# Patient Record
Sex: FEMALE | Race: WHITE | NOT HISPANIC OR LATINO | Employment: OTHER | URBAN - METROPOLITAN AREA
[De-identification: names, ages, dates, MRNs, and addresses within clinical notes are randomized per-mention and may not be internally consistent; named-entity substitution may affect disease eponyms.]

---

## 2024-06-16 ENCOUNTER — APPOINTMENT (OUTPATIENT)
Dept: OTHER | Facility: HOSPITAL | Age: 74
DRG: 042 | End: 2024-06-16
Payer: MEDICARE

## 2024-06-16 ENCOUNTER — HOSPITAL ENCOUNTER (INPATIENT)
Facility: HOSPITAL | Age: 74
LOS: 3 days | Discharge: NURSING, REHAB OR LONG-TERM CARE | DRG: 042 | End: 2024-06-20
Attending: INTERNAL MEDICINE | Admitting: INTERNAL MEDICINE
Payer: MEDICARE

## 2024-06-16 ENCOUNTER — APPOINTMENT (OUTPATIENT)
Dept: CT IMAGING | Facility: HOSPITAL | Age: 74
DRG: 042 | End: 2024-06-16
Payer: MEDICARE

## 2024-06-16 ENCOUNTER — APPOINTMENT (OUTPATIENT)
Dept: MRI IMAGING | Facility: HOSPITAL | Age: 74
DRG: 042 | End: 2024-06-16
Payer: MEDICARE

## 2024-06-16 DIAGNOSIS — R13.11 ORAL PHASE DYSPHAGIA: Primary | ICD-10-CM

## 2024-06-16 DIAGNOSIS — R47.01 APHASIA: ICD-10-CM

## 2024-06-16 DIAGNOSIS — I63.412 CEREBROVASCULAR ACCIDENT (CVA) DUE TO EMBOLISM OF LEFT MIDDLE CEREBRAL ARTERY: ICD-10-CM

## 2024-06-16 PROBLEM — I10 ESSENTIAL HYPERTENSION: Status: ACTIVE | Noted: 2024-06-16

## 2024-06-16 PROBLEM — I63.9 ACUTE CVA (CEREBROVASCULAR ACCIDENT): Status: ACTIVE | Noted: 2024-06-16

## 2024-06-16 PROBLEM — I72.5 BASILAR ARTERY ANEURYSM: Status: ACTIVE | Noted: 2024-06-16

## 2024-06-16 LAB — GLUCOSE BLDC GLUCOMTR-MCNC: 115 MG/DL (ref 70–130)

## 2024-06-16 PROCEDURE — 70496 CT ANGIOGRAPHY HEAD: CPT

## 2024-06-16 PROCEDURE — 70551 MRI BRAIN STEM W/O DYE: CPT

## 2024-06-16 PROCEDURE — G0378 HOSPITAL OBSERVATION PER HR: HCPCS

## 2024-06-16 PROCEDURE — 99222 1ST HOSP IP/OBS MODERATE 55: CPT | Performed by: NURSE PRACTITIONER

## 2024-06-16 PROCEDURE — 4A03X5D MEASUREMENT OF ARTERIAL FLOW, INTRACRANIAL, EXTERNAL APPROACH: ICD-10-PCS | Performed by: RADIOLOGY

## 2024-06-16 PROCEDURE — 82948 REAGENT STRIP/BLOOD GLUCOSE: CPT

## 2024-06-16 PROCEDURE — 99291 CRITICAL CARE FIRST HOUR: CPT | Performed by: INTERNAL MEDICINE

## 2024-06-16 PROCEDURE — 70450 CT HEAD/BRAIN W/O DYE: CPT

## 2024-06-16 PROCEDURE — 70544 MR ANGIOGRAPHY HEAD W/O DYE: CPT

## 2024-06-16 PROCEDURE — 25510000001 IOPAMIDOL PER 1 ML: Performed by: INTERNAL MEDICINE

## 2024-06-16 RX ORDER — SODIUM CHLORIDE 0.9 % (FLUSH) 0.9 %
10 SYRINGE (ML) INJECTION EVERY 12 HOURS SCHEDULED
Status: DISCONTINUED | OUTPATIENT
Start: 2024-06-16 | End: 2024-06-20 | Stop reason: HOSPADM

## 2024-06-16 RX ORDER — NICARDIPINE HYDROCHLORIDE 2.5 MG/ML
INJECTION INTRAVENOUS
Status: DISCONTINUED
Start: 2024-06-16 | End: 2024-06-16 | Stop reason: WASHOUT

## 2024-06-16 RX ORDER — ATORVASTATIN CALCIUM 40 MG/1
80 TABLET, FILM COATED ORAL NIGHTLY
Status: DISCONTINUED | OUTPATIENT
Start: 2024-06-16 | End: 2024-06-20 | Stop reason: HOSPADM

## 2024-06-16 RX ORDER — CLOPIDOGREL BISULFATE 75 MG/1
300 TABLET ORAL ONCE
Status: COMPLETED | OUTPATIENT
Start: 2024-06-17 | End: 2024-06-17

## 2024-06-16 RX ORDER — SODIUM CHLORIDE 9 MG/ML
40 INJECTION, SOLUTION INTRAVENOUS AS NEEDED
Status: DISCONTINUED | OUTPATIENT
Start: 2024-06-16 | End: 2024-06-20 | Stop reason: HOSPADM

## 2024-06-16 RX ORDER — SODIUM CHLORIDE 0.9 % (FLUSH) 0.9 %
10 SYRINGE (ML) INJECTION AS NEEDED
Status: DISCONTINUED | OUTPATIENT
Start: 2024-06-16 | End: 2024-06-20 | Stop reason: HOSPADM

## 2024-06-16 RX ORDER — ASPIRIN 300 MG/1
300 SUPPOSITORY RECTAL DAILY
Status: DISCONTINUED | OUTPATIENT
Start: 2024-06-16 | End: 2024-06-20 | Stop reason: HOSPADM

## 2024-06-16 RX ORDER — NICARDIPINE HYDROCHLORIDE 2.5 MG/ML
INJECTION INTRAVENOUS
Status: DISPENSED
Start: 2024-06-16 | End: 2024-06-17

## 2024-06-16 RX ORDER — ASPIRIN 81 MG/1
81 TABLET, CHEWABLE ORAL DAILY
Status: DISCONTINUED | OUTPATIENT
Start: 2024-06-17 | End: 2024-06-16

## 2024-06-16 RX ORDER — ASPIRIN 81 MG/1
81 TABLET, CHEWABLE ORAL DAILY
Status: DISCONTINUED | OUTPATIENT
Start: 2024-06-16 | End: 2024-06-20 | Stop reason: HOSPADM

## 2024-06-16 RX ORDER — ASPIRIN 300 MG/1
300 SUPPOSITORY RECTAL DAILY
Status: DISCONTINUED | OUTPATIENT
Start: 2024-06-17 | End: 2024-06-16

## 2024-06-16 RX ADMIN — IOPAMIDOL 75 ML: 755 INJECTION, SOLUTION INTRAVENOUS at 19:09

## 2024-06-16 NOTE — CONSULTS
Stroke Consult Note    Patient Name: Aminah Calderon   MRN: [id number]  Age: 73 y.o.  Sex: female  : 1950    Primary Care Physician: Provider, No Known  Referring Physician: Dr. Alonzo    TIME STROKE TEAM CALLED: 1639 EST     TIME PATIENT SEEN:  EST    Handedness: Right   Race:     Chief Complaint/Reason for Consultation: Cerebral venous thrombosis with neurodeficits    HPI:   This is Ms. Aminah Tellez is a 73-year-old white female, right-handed with significant health diagnosis for hypertension, hyperlipidemia, GERD, anxiety and depression, remote tobacco use who presented to [facility] for slurred speech and decreased  right hand, CT head at the outside hospital, CTA revealed no hemorrhage, patent anterior circulation and posterior circulation, 4 mm tip basilar aneurysm, thrombosis of the left sigmoid sinus and partially of left transverse sinus with collateralization that could be lead\subacute to chronic, there was no subcu flow seen within the left IJ vein and its proximal. Rest of dural venous sinuses appear patent, at the outside hospital patient was reported to be hypertensive systolic blood pressure 230 she was placed on Cardene drip dropped her blood pressure to 140 at that time patient declined neurologically and her initial NIH score increased from 1-10,  Per the report at the outside hospital, patient laid down for a nap around 10 AM central time, woke up at 12 was right-sided weakness that resolved and some slurred speech, difficulty finding words.  Reported NIH 1 initially and then increased to 10, GCS 15, able to move lower extremities gait is stable wo ataxia or bedtime patient was recommended to be transferred to our facility for higher level of care.  Patient was transferred to our facility via Air mode.  Upon arrival, patient is alert, oriented to herself, ill looking, expressive aphasia (mute), follow commands, able to move all extremities with noted right hand  fine motor loss, no handgrip, no drift to upper or lower extremity, tongue protrudes to the right, no gaze preference, no neglect, sensory intact to upper and lower extremity.  Noted right facial droop, ptosis, she is PERRLA@3mm.   Systolic blood pressure on arrival 169/106, normal sinus rhythm with heart rate of 73 b/min, breathing comfortable on room air satting above 98%.    Patient lives with her , independent with all ADL, remote former smoker 40 years ago.  No reported alcohol or illicit drug or marijuana use.  Of note  Outside hospital contacted  stroke to transfer the patient to their facility however patient was not interventional for TNK or mechanical thrombectomy it was deferred from transfer to  due to no bed available.  Last Known Normal Date/Time: 11 AM EST     Review of Systems   Constitutional:  Positive for activity change.   HENT:  Positive for drooling and trouble swallowing.    Eyes:  Negative for visual disturbance.   Respiratory: Negative.     Cardiovascular: Negative.    Gastrointestinal: Negative.    Endocrine: Negative.    Genitourinary: Negative.    Musculoskeletal: Negative.    Skin: Negative.    Neurological:  Positive for facial asymmetry, speech difficulty and weakness.   Hematological: Negative.    Psychiatric/Behavioral: Negative.        Past medical history  Hypertension, hyperlipidemia, anxiety and depression  Home medication  Xanax 0.25 mg as needed  Conjugated estrogen Premarin 1.25 mg oral tablet daily  Hydralazine 10 mg 2 times daily  Metoprolol succinate ER 50 mg 1 tablet twice daily  Omeprazole 40 mg delayed release 1 daily  Sertraline hydrochlorothiazide 320 mg - 25 mg take 1 tablet daily  Social History     Socioeconomic History    Marital status:      No Known Allergies  Prior to Admission medications    Not on File            Neurological Exam  Mental Status  Awake and alert. Oriented only to person. Severe dysarthria present. Expressive aphasia present.  Follows two-step commands.    Cranial Nerves  CN II: Right homonymous hemianopsia. Left homonymous hemianopsia.  CN III, IV, VI: Extraocular movements intact bilaterally. Right ptosis. Pupils equal round and reactive to light bilaterally.  CN V:  Right: Facial sensation is normal. Jaw strength is abnormal. Reduced corneal reflex.  Left: Facial sensation is normal on the left. Jaw strength is abnormal. Reduced corneal reflex.  CN VII:  Right: There is central facial weakness.  Left: There is no facial weakness.  CN VIII: Intact hearing bilateral.  CN IX, X:  Right: Palate is weak.  Left: Palate is weak.  CN XI: Shoulder shrug strength is normal.  CN XII: Tongue deviates to the right.    Motor  Normal muscle bulk throughout. No fasciculations present. Normal muscle tone. No abnormal involuntary movements.  Decreased right hand   No drift upper or lower extremity, strength 5/5 bilateral lower extremity, strength 4/5 RUE, strength 5/5 LUE with no drift.    Sensory  Light touch is normal in upper and lower extremities.     Reflexes  Right Plantar: downgoing  Left Plantar: downgoing    Right pathological reflexes: Ankle clonus absent.  Left pathological reflexes: Ankle clonus absent.    Coordination  Right: Finger-to-nose abnormality: Heel-to-shin normal.Left: Finger-to-nose normal. Heel-to-shin normal.    Gait    Unable to assess at this time.      Physical Exam  Constitutional:       General: She is awake.      Appearance: She is ill-appearing.   HENT:      Head: Normocephalic and atraumatic.      Mouth/Throat:      Mouth: Mucous membranes are dry.      Comments: Drooling  Eyes:      Extraocular Movements: Extraocular movements intact.      Pupils: Pupils are equal, round, and reactive to light.   Cardiovascular:      Rate and Rhythm: Normal rate and regular rhythm.      Pulses: Normal pulses.   Pulmonary:      Effort: Pulmonary effort is normal.   Abdominal:      Tenderness: There is no abdominal tenderness.    Musculoskeletal:         General: Normal range of motion.      Cervical back: Normal range of motion and neck supple.   Skin:     General: Skin is warm and dry.      Capillary Refill: Capillary refill takes less than 2 seconds.   Neurological:      Mental Status: She is alert.      Cranial Nerves: Cranial nerve deficit and dysarthria present.      Motor: Weakness present.   Psychiatric:      Comments: Unable to assess         Acute Stroke Data    Thrombolytic Inclusion / Exclusion Criteria    Time: 19:30 EDT  Person Administering Scale: CONTRERAS Jorge    Inclusion Criteria  [x]   18 years of age or greater   []   Onset of symptoms < 4.5 hours before beginning treatment (stroke onset = time patient was last seen well or without symptoms).   []   Diagnosis of acute ischemic stroke causing measurable disabling deficit (Complete Hemianopia, Any Aphasia, Visual or Sensory Extinction, Any weakness limiting sustained effort against gravity)   []   Any remaining deficit considered potentially disabling in view of patient and practitioner   Exclusion criteria (Do not proceed with Alteplase if any are checked under exclusion criteria)  [x]   Onset unknown or GREATER than 4.5 hours   []   ICH on CT/MRI   []   CT demonstrates hypodensity representing acute or subacute infarct   []   Significant head trauma or prior stroke in the previous 3 months   []   Symptoms suggestive of subarachnoid hemorrhage   []   History of un-ruptured intracranial aneurysm GREATER than 10 mm   []   Recent intracranial or intraspinal surgery within the last 3 months   []   Arterial puncture at a non-compressible site in the previous 7 days   []   Active internal bleeding   []   Acute bleeding tendency   []   Platelet count LESS than 100,000 for known hematological diseases such as leukemia, thrombocytopenia or chronic cirrhosis   []   Current use of anticoagulant with INR GREATER than 1.7 or PT GREATER than 15 seconds, aPTT GREATER than  40 seconds   []   Heparin received within 48 hours, resulting in abnormally elevated aPTT GREATER than upper limit of normal   []   Current use of direct thrombin inhibitors or direct factor Xa inhibitors in the past 48 hours   []   Elevated blood pressure refractory to treatment (systolic GREATER than 185 mm/Hg or diastolic  GREATER than 110 mm/Hg   []   Suspected infective endocarditis and aortic arch dissection   []   Current use of therapeutic treatment dose of low-molecular-weight heparin (LMWH) within the previous 24 hours   []   Structural GI malignancy or bleed   Relative exclusion for all patients  []   Only minor non-disabling symptoms   []   Pregnancy   []   Seizure at onset with postictal residual neurological impairments   []   Major surgery or previous trauma within past 14 days   []   History of previous spontaneous ICH, intracranial neoplasm, or AV malformation   []   Postpartum (within previous 14 days)   []   Recent GI or urinary tract hemorrhage (within previous 21 days)   []   Recent acute MI (within previous 3 months)   []   History of un-ruptured intracranial aneurysm LESS than 10 mm   []   History of ruptured intracranial aneurysm   []   Blood glucose LESS than 50 mg/dL (2.7 mmol/L)   []   Dural puncture within the last 7 days   []   Known GREATER than 10 cerebral microbleeds   Additional exclusions for patients with symptoms onset between 3 and 4.5 hours.  []   Age > 80.   []   On any anticoagulants regardless of INR  >>> Warfarin (Coumadin), Heparin, Enoxaparin (Lovenox), fondaparinux (Arixtra), bivalirudin (Angiomax), Argatroban, dabigatran (Pradaxa), rivaroxaban (Xarelto), or apixaban (Eliquis)   []   Severe stroke (NIHSS > 25).   []   History of BOTH diabetes and previous ischemic stroke.   []   The risks and benefits have been discussed with the patient or family related to the administration of IV thrombolytic therapy for stroke symptoms.   []   I have discussed and reviewed the  patient's case and imaging with the attending prior to IV thrombolytic therapy.   na Time IV thrombolytic administered       Hospital Meds:  Scheduled- niCARdipine, , ,       Infusions- niCARdipine, 5-15 mg/hr       PRNs-   niCARdipine    Functional Status Prior to Current Stroke/Rosanne Score: 0    NIH Stroke Scale  Time: 19:30 EDT  Person Administering Scale: CONTRERAS Jorge    Interval: baseline  1a. Level of Consciousness: 0-->Alert, keenly responsive  1b. LOC Questions: 2-->Answers neither question correctly  1c. LOC Commands: 0-->Performs both tasks correctly  2. Best Gaze: 0-->Normal  3. Visual: 1-->Partial hemianopia  4. Facial Palsy: 2-->Partial paralysis (total or near-total paralysis of lower face)  5a. Motor Arm, Left: 0-->No drift, limb holds 90 (or 45) degrees for full 10 secs  5b. Motor Arm, Right: 0-->No drift, limb holds 90 (or 45) degrees for full 10 secs (Right hand fine motor loss)  6a. Motor Leg, Left: 0-->No drift, leg holds 30 degree position for full 5 secs  6b. Motor Leg, Right: 0-->No drift, leg holds 30 degree position for full 5 secs  7. Limb Ataxia: 0-->Absent  8. Sensory: 0-->Normal, no sensory loss  9. Best Language: 3-->Mute, global aphasia, no usable speech or auditory comprehension  10. Dysarthria: 2-->Severe dysarthria, patients speech is so slurred as to be unintelligible in the absence of or out of proportion to any dysphasia, or is mute/anarthric  11. Extinction and Inattention (formerly Neglect): 0-->No abnormality    Total (NIH Stroke Scale): 10     Results Reviewed:  I have personally reviewed current lab, radiology, and data and agree with results.  Labs  WBC 8.63  H&H 12.9\37.7  Platelet 281  MCV 83.8  Sodium 136  Potassium 3  Glucose 92  BUN 17  Creatinine 0.6  PT 12.3  INR 0.92  PTT 28.7  Images  CT head images at outside hospital report findings  Aortic arch is normal in caliber and contour without underlying dissection.  Common carotid artery appears to be  patent throughout their course without significant atherosclerotic disease  The bilateral carotid bifurcation appears patent the bilateral internal carotid arteries appeared patent throughout their course to the level of the cranial base, torturous bilateral ICA is noted  The bilateral vertebral arteries appear to be patent throughout the course originating from respective subclavian artery  The visualized basilar artery appears patent as well, and the tip of the basilar 4 mm aneurysm.  There is appear to be thrombosis of left sigmoid sinus and partial off left transverse sinus with collateralization, no subcu flow seen within the left IJ vein and its proximal aspect of the wrist of the door to oral venous sinuses visualized appears patent.  Thyroid gland appears to be slightly heterogenous with possible hypo-Pudenz nodules scattered within.  Submandibular gland and parotid glands appear to be unremarkable.  Airway appears to be patent.  Increased in total Leupold septal thickening as well as 3 bronchial thickening within the bilateral bases which could relate to interstitial pulmonary edema/atypical viral pneumonia.         Assessment/Plan:    # Is a 73-year-old female, right-handed with multiple vascular risk factor who presents to outside hospital for slurred speech and right hand decreased , initial NIH 1 patient was placed on a Cardene drip blood pressure dropped from 2 very to 159 this time patient had neuro decline, NIH 10, Cardene was discontinued and was advised to keep blood pressure 185, patient was not a candidate for TNK secondary to 4 mm basilar aneurysm, patient was not a chemical thrombectomy candidate secondary to no LVO on CTA head and neck.  Patient is transferred to our facility for higher level of care.    Antiplatelet PTA: None  Anticoagulant PTA: None        Expressive aphasia  Dysarthria  Right hand fine motor loss  4 mm tip basilar aneurysm  Ddx: TIA\ischemic stroke,  CVT.    -TIA\ischemic stroke without IV thrombolytic therapy order set in place  -CT head without contrast negative for hemorrhage, CTA head and neck negative for LVO, remarkable for 4 mm tip basilar aneurysm, at the outside hospital reported partial left sigmoid\transverse thrombosis however with repeat images at our facility negative for CVT, revealed 4 mm tip basilar aneurysm.  -MRV ordered and pending  -MRI ordered and pending thin slices brainstem, posterior circulation.  -Echo ordered and pending  -A1c and lipid profile ordered and pending  -Initiate 81 mg p.o. aspirin or 300 mg rectal for now, will escalate accordingly after reviewing MRV and MRI.  -Discussed images and plan with Dr. Hinojosa neurology attending.  If no CVT will initiate DAPT aspirin and Plavix, if MRV revealed cerebral venous thrombosis will initiate low-dose heparin with no bolus ever.  -Systolic blood pressure less than 180 (160-180) Cardene drip as needed to maintain goal  -Will initiate high intensity statin Lipitor 80 mg p.o. at night and daily for secondary stroke prevention  -NPO until dysphagia screen passed  -PT\OT\SLP evaluation  -Case management for final discharge planning  -NIH\neurocheck per protocol  -Neurology stroke will continue to follow  -Patient will need to follow-up with his neurosurgery outpatient      Essential hypertension  -Initially at the outside hospital patient was hypertensive with systolic blood pressure reported as 2 therapy patient was placed on Cardene drip dropped her blood pressure to 159 systolic at that time there was neuro decline Cardene drip was discontinued.  -Systolic blood pressure goal 1 60-1 80, till clearance of MRI  -Manage by medicine team    Hyperlipidemia  -Lipid profile ordered and pending  -High intensity statin as above    GERD  -Managed by medicine team    Anxiety and depression  -Managed by medicine to    Overweight  -BMI 26.93  -Complicates all aspects of care      I discussed plan of  care with patient, family.  Plan discussed with Dr. Hinojosa neurology attending, reports provided by the  day APRN to intensivist.  Neurology stroke will continue to follow.  Thank you for letting us participate in patient care.  Addendum    MRI Brain Without Contrast    Result Date: 6/16/2024  Impression: 1.Acute/subacute infarct in the posterior left frontal lobe. 2.Minimal chronic small vessel ischemic change. 3.Small left mastoid effusion. Electronically Signed: Jose Ontiveros MD  6/16/2024 11:22 PM EDT  Workstation ID: [id number]    MRI Angiogram Venogram Head    Result Date: 6/16/2024  Impression: No evidence of dural venous sinus thrombosis. Electronically Signed: Jose Ontiveros MD  6/16/2024 10:55 PM EDT  Workstation ID: [id number]    CT venogram head    Result Date: 6/16/2024  0.4 cm aneurysm of the basilar artery tip. Otherwise no vessel stenosis or occlusion is appreciated. Electronically Signed: Yg Denise MD  6/16/2024 7:23 PM EDT  Workstation ID: [id number]    CT Angiogram Head w AI Analysis of LVO    Result Date: 6/16/2024  0.4 cm aneurysm of the basilar artery tip. Otherwise no vessel stenosis or occlusion is appreciated. Electronically Signed: Yg Denise MD  6/16/2024 7:23 PM EDT  Workstation ID: [id number]    CT Head Without Contrast Stroke Protocol    Result Date: 6/16/2024  Impression: 1. No intracranial hemorrhage 2. No CT changes of acute vascular territory brain ischemia at this time Electronically Signed: Srinath Godfrey  6/16/2024 7:12 PM EDT  Workstation ID: [id number]        Acute /subacute L frontal cortical infarct   Etiology: Unclear yet, central , cannot rule out embolic  -MRI revealed left frontal acute/subacute infarct, MRV negative for CVT, CTA negative for LVO, insignificant atherosclerosis, unable to see CTA neck, will upload all outside hospital CD images.  -Continue TIA\ischemic stroke without IV thrombolytic therapy order set   -Will initiate DAPT aspirin 81 mg, Plavix loading dose 300  mg followed by 75 mg, will continue for 30 days for now then monotherapy with aspirin 81 mg indefinite  -Allow gradual normalization of blood pressure, avoid hypotension to avoid hypoperfusion at least for 24 hours  -High intensity statin as previously ordered secondary stroke prevention  -Echo ordered and pending to rule out any cardioembolic pathology  -If echo was negative will recommend loop recorder on discharge versus Holter monitor  -Neurology stroke will continue to follow        CONTRERAS Jorge  June 16, 2024  19:30 EDT

## 2024-06-17 ENCOUNTER — APPOINTMENT (OUTPATIENT)
Dept: CARDIOLOGY | Facility: HOSPITAL | Age: 74
DRG: 042 | End: 2024-06-17
Payer: MEDICARE

## 2024-06-17 ENCOUNTER — APPOINTMENT (OUTPATIENT)
Dept: MRI IMAGING | Facility: HOSPITAL | Age: 74
DRG: 042 | End: 2024-06-17
Payer: MEDICARE

## 2024-06-17 ENCOUNTER — APPOINTMENT (OUTPATIENT)
Dept: NEUROLOGY | Facility: HOSPITAL | Age: 74
DRG: 042 | End: 2024-06-17
Payer: MEDICARE

## 2024-06-17 PROBLEM — I63.9 STROKE: Status: ACTIVE | Noted: 2024-06-17

## 2024-06-17 LAB
ANION GAP SERPL CALCULATED.3IONS-SCNC: 11 MMOL/L (ref 5–15)
ASCENDING AORTA: 2.8 CM
BASOPHILS # BLD AUTO: 0.03 10*3/MM3 (ref 0–0.2)
BASOPHILS NFR BLD AUTO: 0.3 % (ref 0–1.5)
BH CV ECHO MEAS - AO MAX PG: 13.4 MMHG
BH CV ECHO MEAS - AO MEAN PG: 7 MMHG
BH CV ECHO MEAS - AO ROOT DIAM: 2.8 CM
BH CV ECHO MEAS - AO V2 MAX: 183 CM/SEC
BH CV ECHO MEAS - AO V2 VTI: 38.9 CM
BH CV ECHO MEAS - AVA(I,D): 1.56 CM2
BH CV ECHO MEAS - EDV(CUBED): 54.9 ML
BH CV ECHO MEAS - EDV(MOD-SP2): 104 ML
BH CV ECHO MEAS - EDV(MOD-SP4): 98.4 ML
BH CV ECHO MEAS - EF(MOD-BP): 60.6 %
BH CV ECHO MEAS - EF(MOD-SP2): 62 %
BH CV ECHO MEAS - EF(MOD-SP4): 60.7 %
BH CV ECHO MEAS - ESV(CUBED): 15.6 ML
BH CV ECHO MEAS - ESV(MOD-SP2): 39.5 ML
BH CV ECHO MEAS - ESV(MOD-SP4): 38.7 ML
BH CV ECHO MEAS - FS: 34.2 %
BH CV ECHO MEAS - IVS/LVPW: 1 CM
BH CV ECHO MEAS - IVSD: 0.9 CM
BH CV ECHO MEAS - LA DIMENSION: 3.2 CM
BH CV ECHO MEAS - LAT PEAK E' VEL: 8.6 CM/SEC
BH CV ECHO MEAS - LV DIASTOLIC VOL/BSA (35-75): 56.4 CM2
BH CV ECHO MEAS - LV MASS(C)D: 101.1 GRAMS
BH CV ECHO MEAS - LV MAX PG: 4.6 MMHG
BH CV ECHO MEAS - LV MEAN PG: 3 MMHG
BH CV ECHO MEAS - LV SYSTOLIC VOL/BSA (12-30): 22.2 CM2
BH CV ECHO MEAS - LV V1 MAX: 107 CM/SEC
BH CV ECHO MEAS - LV V1 VTI: 23.9 CM
BH CV ECHO MEAS - LVIDD: 3.8 CM
BH CV ECHO MEAS - LVIDS: 2.5 CM
BH CV ECHO MEAS - LVOT AREA: 2.5 CM2
BH CV ECHO MEAS - LVOT DIAM: 1.8 CM
BH CV ECHO MEAS - LVPWD: 0.9 CM
BH CV ECHO MEAS - MED PEAK E' VEL: 7.9 CM/SEC
BH CV ECHO MEAS - MV A MAX VEL: 36.7 CM/SEC
BH CV ECHO MEAS - MV DEC SLOPE: 566 CM/SEC2
BH CV ECHO MEAS - MV DEC TIME: 0.3 SEC
BH CV ECHO MEAS - MV E MAX VEL: 108 CM/SEC
BH CV ECHO MEAS - MV E/A: 2.9
BH CV ECHO MEAS - MV MAX PG: 6.3 MMHG
BH CV ECHO MEAS - MV MEAN PG: 2 MMHG
BH CV ECHO MEAS - MV P1/2T: 66.2 MSEC
BH CV ECHO MEAS - MV V2 VTI: 36 CM
BH CV ECHO MEAS - MVA(P1/2T): 3.3 CM2
BH CV ECHO MEAS - MVA(VTI): 1.69 CM2
BH CV ECHO MEAS - PA ACC TIME: 0.06 SEC
BH CV ECHO MEAS - SV(LVOT): 60.8 ML
BH CV ECHO MEAS - SV(MOD-SP2): 64.5 ML
BH CV ECHO MEAS - SV(MOD-SP4): 59.7 ML
BH CV ECHO MEAS - SVI(LVOT): 34.9 ML/M2
BH CV ECHO MEAS - SVI(MOD-SP2): 37 ML/M2
BH CV ECHO MEAS - SVI(MOD-SP4): 34.2 ML/M2
BH CV ECHO MEAS - TAPSE (>1.6): 2.6 CM
BH CV ECHO MEASUREMENTS AVERAGE E/E' RATIO: 13.09
BH CV VAS BP LEFT ARM: NORMAL MMHG
BH CV XLRA - RV BASE: 3.1 CM
BH CV XLRA - RV LENGTH: 7.4 CM
BH CV XLRA - RV MID: 2.6 CM
BH CV XLRA - TDI S': 13.2 CM/SEC
BUN SERPL-MCNC: 11 MG/DL (ref 8–23)
BUN/CREAT SERPL: 18.3 (ref 7–25)
CALCIUM SPEC-SCNC: 8.9 MG/DL (ref 8.6–10.5)
CHLORIDE SERPL-SCNC: 97 MMOL/L (ref 98–107)
CHOLEST SERPL-MCNC: 185 MG/DL (ref 0–200)
CO2 SERPL-SCNC: 30 MMOL/L (ref 22–29)
CREAT SERPL-MCNC: 0.6 MG/DL (ref 0.57–1)
DEPRECATED RDW RBC AUTO: 41.1 FL (ref 37–54)
EGFRCR SERPLBLD CKD-EPI 2021: 94.9 ML/MIN/1.73
EOSINOPHIL # BLD AUTO: 0.01 10*3/MM3 (ref 0–0.4)
EOSINOPHIL NFR BLD AUTO: 0.1 % (ref 0.3–6.2)
ERYTHROCYTE [DISTWIDTH] IN BLOOD BY AUTOMATED COUNT: 13.4 % (ref 12.3–15.4)
GLUCOSE BLDC GLUCOMTR-MCNC: 111 MG/DL (ref 70–130)
GLUCOSE SERPL-MCNC: 105 MG/DL (ref 65–99)
HBA1C MFR BLD: 5.5 % (ref 4.8–5.6)
HCT VFR BLD AUTO: 37 % (ref 34–46.6)
HDLC SERPL-MCNC: 67 MG/DL (ref 40–60)
HGB BLD-MCNC: 12.5 G/DL (ref 12–15.9)
IMM GRANULOCYTES # BLD AUTO: 0.04 10*3/MM3 (ref 0–0.05)
IMM GRANULOCYTES NFR BLD AUTO: 0.3 % (ref 0–0.5)
LDLC SERPL CALC-MCNC: 83 MG/DL (ref 0–100)
LDLC/HDLC SERPL: 1.13 {RATIO}
LEFT ATRIUM VOLUME INDEX: 23.4 ML/M2
LYMPHOCYTES # BLD AUTO: 1.51 10*3/MM3 (ref 0.7–3.1)
LYMPHOCYTES NFR BLD AUTO: 12.7 % (ref 19.6–45.3)
MAGNESIUM SERPL-MCNC: 1.4 MG/DL (ref 1.6–2.4)
MCH RBC QN AUTO: 28.3 PG (ref 26.6–33)
MCHC RBC AUTO-ENTMCNC: 33.8 G/DL (ref 31.5–35.7)
MCV RBC AUTO: 83.7 FL (ref 79–97)
MONOCYTES # BLD AUTO: 0.82 10*3/MM3 (ref 0.1–0.9)
MONOCYTES NFR BLD AUTO: 6.9 % (ref 5–12)
NEUTROPHILS NFR BLD AUTO: 79.7 % (ref 42.7–76)
NEUTROPHILS NFR BLD AUTO: 9.44 10*3/MM3 (ref 1.7–7)
NRBC BLD AUTO-RTO: 0 /100 WBC (ref 0–0.2)
PLATELET # BLD AUTO: 291 10*3/MM3 (ref 140–450)
PMV BLD AUTO: 9.8 FL (ref 6–12)
POTASSIUM SERPL-SCNC: 3.6 MMOL/L (ref 3.5–5.2)
POTASSIUM SERPL-SCNC: 4 MMOL/L (ref 3.5–5.2)
RBC # BLD AUTO: 4.42 10*6/MM3 (ref 3.77–5.28)
SODIUM SERPL-SCNC: 138 MMOL/L (ref 136–145)
TRIGL SERPL-MCNC: 211 MG/DL (ref 0–150)
VLDLC SERPL-MCNC: 35 MG/DL (ref 5–40)
WBC NRBC COR # BLD AUTO: 11.85 10*3/MM3 (ref 3.4–10.8)

## 2024-06-17 PROCEDURE — 97162 PT EVAL MOD COMPLEX 30 MIN: CPT

## 2024-06-17 PROCEDURE — 84132 ASSAY OF SERUM POTASSIUM: CPT | Performed by: INTERNAL MEDICINE

## 2024-06-17 PROCEDURE — 25010000002 MAGNESIUM SULFATE 2 GM/50ML SOLUTION: Performed by: INTERNAL MEDICINE

## 2024-06-17 PROCEDURE — 93306 TTE W/DOPPLER COMPLETE: CPT | Performed by: INTERNAL MEDICINE

## 2024-06-17 PROCEDURE — 95819 EEG AWAKE AND ASLEEP: CPT | Performed by: PSYCHIATRY & NEUROLOGY

## 2024-06-17 PROCEDURE — 82948 REAGENT STRIP/BLOOD GLUCOSE: CPT

## 2024-06-17 PROCEDURE — 95819 EEG AWAKE AND ASLEEP: CPT

## 2024-06-17 PROCEDURE — 70551 MRI BRAIN STEM W/O DYE: CPT

## 2024-06-17 PROCEDURE — 25010000002 ONDANSETRON PER 1 MG: Performed by: INTERNAL MEDICINE

## 2024-06-17 PROCEDURE — 99233 SBSQ HOSP IP/OBS HIGH 50: CPT | Performed by: STUDENT IN AN ORGANIZED HEALTH CARE EDUCATION/TRAINING PROGRAM

## 2024-06-17 PROCEDURE — 80061 LIPID PANEL: CPT | Performed by: NURSE PRACTITIONER

## 2024-06-17 PROCEDURE — 97535 SELF CARE MNGMENT TRAINING: CPT

## 2024-06-17 PROCEDURE — 25010000002 ENOXAPARIN PER 10 MG

## 2024-06-17 PROCEDURE — 80048 BASIC METABOLIC PNL TOTAL CA: CPT | Performed by: INTERNAL MEDICINE

## 2024-06-17 PROCEDURE — 85025 COMPLETE CBC W/AUTO DIFF WBC: CPT | Performed by: INTERNAL MEDICINE

## 2024-06-17 PROCEDURE — 92523 SPEECH SOUND LANG COMPREHEN: CPT

## 2024-06-17 PROCEDURE — 83036 HEMOGLOBIN GLYCOSYLATED A1C: CPT | Performed by: NURSE PRACTITIONER

## 2024-06-17 PROCEDURE — 97166 OT EVAL MOD COMPLEX 45 MIN: CPT

## 2024-06-17 PROCEDURE — 99291 CRITICAL CARE FIRST HOUR: CPT | Performed by: INTERNAL MEDICINE

## 2024-06-17 PROCEDURE — 83735 ASSAY OF MAGNESIUM: CPT | Performed by: INTERNAL MEDICINE

## 2024-06-17 PROCEDURE — 25010000002 PROCHLORPERAZINE 10 MG/2ML SOLUTION: Performed by: INTERNAL MEDICINE

## 2024-06-17 PROCEDURE — 93306 TTE W/DOPPLER COMPLETE: CPT

## 2024-06-17 PROCEDURE — 92610 EVALUATE SWALLOWING FUNCTION: CPT

## 2024-06-17 RX ORDER — CEPHALEXIN 250 MG/1
250 CAPSULE ORAL DAILY
COMMUNITY
End: 2024-06-20 | Stop reason: HOSPADM

## 2024-06-17 RX ORDER — PANTOPRAZOLE SODIUM 40 MG/1
40 TABLET, DELAYED RELEASE ORAL
Status: DISCONTINUED | OUTPATIENT
Start: 2024-06-18 | End: 2024-06-20 | Stop reason: HOSPADM

## 2024-06-17 RX ORDER — POTASSIUM CHLORIDE 20 MEQ/1
40 TABLET, EXTENDED RELEASE ORAL EVERY 4 HOURS
Status: COMPLETED | OUTPATIENT
Start: 2024-06-17 | End: 2024-06-17

## 2024-06-17 RX ORDER — ENOXAPARIN SODIUM 100 MG/ML
40 INJECTION SUBCUTANEOUS DAILY
Status: DISCONTINUED | OUTPATIENT
Start: 2024-06-17 | End: 2024-06-20 | Stop reason: HOSPADM

## 2024-06-17 RX ORDER — AMOXICILLIN 250 MG
2 CAPSULE ORAL 2 TIMES DAILY
Status: DISCONTINUED | OUTPATIENT
Start: 2024-06-17 | End: 2024-06-20 | Stop reason: HOSPADM

## 2024-06-17 RX ORDER — MAGNESIUM SULFATE HEPTAHYDRATE 40 MG/ML
2 INJECTION, SOLUTION INTRAVENOUS
Status: COMPLETED | OUTPATIENT
Start: 2024-06-17 | End: 2024-06-17

## 2024-06-17 RX ORDER — ONDANSETRON 2 MG/ML
4 INJECTION INTRAMUSCULAR; INTRAVENOUS EVERY 6 HOURS PRN
Status: DISCONTINUED | OUTPATIENT
Start: 2024-06-17 | End: 2024-06-20 | Stop reason: HOSPADM

## 2024-06-17 RX ORDER — ANTIARTHRITIC COMBINATION NO.2 900 MG
TABLET ORAL
COMMUNITY
End: 2024-06-20 | Stop reason: HOSPADM

## 2024-06-17 RX ORDER — PROCHLORPERAZINE EDISYLATE 5 MG/ML
5 INJECTION INTRAMUSCULAR; INTRAVENOUS ONCE
Status: COMPLETED | OUTPATIENT
Start: 2024-06-17 | End: 2024-06-17

## 2024-06-17 RX ORDER — POTASSIUM CHLORIDE 7.45 MG/ML
10 INJECTION INTRAVENOUS
Status: DISCONTINUED | OUTPATIENT
Start: 2024-06-17 | End: 2024-06-17

## 2024-06-17 RX ORDER — CLOPIDOGREL BISULFATE 75 MG/1
75 TABLET ORAL DAILY
Status: DISCONTINUED | OUTPATIENT
Start: 2024-06-18 | End: 2024-06-20 | Stop reason: HOSPADM

## 2024-06-17 RX ORDER — METOPROLOL TARTRATE 50 MG/1
50 TABLET, FILM COATED ORAL DAILY
COMMUNITY
End: 2024-06-20 | Stop reason: HOSPADM

## 2024-06-17 RX ADMIN — SENNOSIDES AND DOCUSATE SODIUM 2 TABLET: 50; 8.6 TABLET ORAL at 20:13

## 2024-06-17 RX ADMIN — CLOPIDOGREL BISULFATE 300 MG: 75 TABLET ORAL at 10:12

## 2024-06-17 RX ADMIN — ENOXAPARIN SODIUM 40 MG: 100 INJECTION SUBCUTANEOUS at 10:12

## 2024-06-17 RX ADMIN — MAGNESIUM SULFATE HEPTAHYDRATE 2 G: 2 INJECTION, SOLUTION INTRAVENOUS at 10:03

## 2024-06-17 RX ADMIN — ONDANSETRON 4 MG: 2 INJECTION INTRAMUSCULAR; INTRAVENOUS at 00:42

## 2024-06-17 RX ADMIN — PROCHLORPERAZINE EDISYLATE 5 MG: 5 INJECTION INTRAMUSCULAR; INTRAVENOUS at 02:40

## 2024-06-17 RX ADMIN — Medication 10 ML: at 00:42

## 2024-06-17 RX ADMIN — MAGNESIUM SULFATE HEPTAHYDRATE 2 G: 2 INJECTION, SOLUTION INTRAVENOUS at 14:59

## 2024-06-17 RX ADMIN — POTASSIUM CHLORIDE 40 MEQ: 1500 TABLET, EXTENDED RELEASE ORAL at 14:59

## 2024-06-17 RX ADMIN — MAGNESIUM SULFATE HEPTAHYDRATE 2 G: 2 INJECTION, SOLUTION INTRAVENOUS at 12:54

## 2024-06-17 RX ADMIN — POTASSIUM CHLORIDE 40 MEQ: 1500 TABLET, EXTENDED RELEASE ORAL at 10:03

## 2024-06-17 RX ADMIN — Medication 10 ML: at 10:17

## 2024-06-17 RX ADMIN — ASPIRIN 300 MG: 300 SUPPOSITORY RECTAL at 00:03

## 2024-06-17 RX ADMIN — Medication 10 ML: at 20:13

## 2024-06-17 NOTE — THERAPY EVALUATION
Patient Name: Aminah Calderon  : 1950    MRN: [id number]                              Today's Date: 2024       Admit Date: 2024    Visit Dx:     ICD-10-CM ICD-9-CM   1. Oral phase dysphagia  R13.11 787.21   2. Aphasia  R47.01 784.3     Patient Active Problem List   Diagnosis    Acute CVA (cerebrovascular accident)    Essential hypertension    Basilar artery aneurysm    Stroke     History reviewed. No pertinent past medical history.  History reviewed. No pertinent surgical history.   General Information       [facility] Name 24 1541          Physical Therapy Time and Intention    Document Type evaluation  -KE     Mode of Treatment physical therapy  -KE       Row Name 24 1541          General Information    Patient Profile Reviewed yes  -KE     Prior Level of Function independent:;all household mobility;community mobility;ADL's;driving;shopping  acts as caregiver for significant other  -KE     Existing Precautions/Restrictions fall;oxygen therapy device and L/min  Expressive aphasia; can answer yes/no appropriately  -KE     Barriers to Rehab medically complex  -KE       Row Name 24 1541          Living Environment    People in Home spouse  -KE       Row Name 24 1541          Home Main Entrance    Number of Stairs, Main Entrance one  -KE     Stair Railings, Main Entrance none  -KE       Row Name 24 1541          Stairs Within Home, Primary    Stairs, Within Home, Primary multilevel home, all needs met on main level  -KE     Number of Stairs, Within Home, Primary twelve  -KE       Row Name 24 1541          Cognition    Orientation Status (Cognition) oriented x 3;verbal cues/prompts needed for orientation  oriented with choices  -KE       Row Name 24 1541          Safety Issues, Functional Mobility    Safety Issues Affecting Function (Mobility) awareness of need for assistance;insight into deficits/self-awareness;safety precaution awareness;safety precautions  follow-through/compliance  -KE     Impairments Affecting Function (Mobility) balance;coordination;endurance/activity tolerance;strength  -KE               User Key  (r) = Recorded By, (t) = Taken By, (c) = Cosigned By      Initials Name Provider Type    Kaylene Carrillo, PT Physical Therapist                   Mobility       Row Name 06/17/24 1544          Bed Mobility    Bed Mobility supine-sit  -KE     Supine-Sit Dolores (Bed Mobility) minimum assist (75% patient effort);1 person assist  -KE     Assistive Device (Bed Mobility) bed rails;head of bed elevated;draw sheet  -KE     Comment, (Bed Mobility) VCs for sequencing, req increased time and effort to complete  -KE       Row Name 06/17/24 1544          Bed-Chair Transfer    Bed-Chair Dolores (Transfers) minimum assist (75% patient effort);verbal cues  -KE     Assistive Device (Bed-Chair Transfers) other (see comments)  UE support  -KE       Row Name 06/17/24 1544          Sit-Stand Transfer    Sit-Stand Dolores (Transfers) verbal cues;1 person assist;minimum assist (75% patient effort)  -KE     Assistive Device (Sit-Stand Transfers) other (see comments)  UE support  -KE     Comment, (Sit-Stand Transfer) x1 STS from EOB, x1 STS from chair  -KE       Row Name 06/17/24 1544          Gait/Stairs (Locomotion)    Dolores Level (Gait) minimum assist (75% patient effort);1 person assist  -KE     Assistive Device (Gait) other (see comments)  UE support on tripod monitor  -KE     Patient was able to Ambulate yes  -KE     Distance in Feet (Gait) 30  +30  -KE     Deviations/Abnormal Patterns (Gait) bilateral deviations;gait speed decreased;stride length decreased;base of support, narrow  -KE     Bilateral Gait Deviations heel strike decreased;forward flexed posture  -KE     Comment, (Gait/Stairs) Pt amb 30'+30' w/ B UE support on tripod monitor, [email or web address]. Pt demo step through gait pattern with slow gait speed and decreased heel strike. Further mobility  limited by fatigue.  -               User Key  (r) = Recorded By, (t) = Taken By, (c) = Cosigned By      Initials Name Provider Type    Kaylene Carrillo PT Physical Therapist                   Obj/Interventions       Row Name 06/17/24 1546          Range of Motion Comprehensive    General Range of Motion bilateral lower extremity ROM WFL  -[facility] Name 06/17/24 1546          Strength Comprehensive (MMT)    General Manual Muscle Testing (MMT) Assessment lower extremity strength deficits identified  -     Comment, General Manual Muscle Testing (MMT) Assessment B LEs grossly 4/5; symmetrical  -       Row Name 06/17/24 1546          Motor Skills    Motor Skills coordination  -     Coordination heel to shin;minimal impairment;right;left;WFL  [facility]       Row Name 06/17/24 1546          Balance    Balance Assessment sitting static balance;sitting dynamic balance;sit to stand dynamic balance;standing static balance;standing dynamic balance  -     Static Sitting Balance supervision  -     Dynamic Sitting Balance supervision  -     Position, Sitting Balance supported;sitting edge of bed  -     Sit to Stand Dynamic Balance minimal assist;1-person assist  -     Static Standing Balance contact guard  -     Dynamic Standing Balance minimal assist  -     Position/Device Used, Standing Balance supported  -     Balance Interventions sitting;standing;sit to stand;supported;static;dynamic  -     Comment, Balance no overt LOB noted  -       Row Name 06/17/24 1546          Sensory Assessment (Somatosensory)    Sensory Assessment (Somatosensory) LE sensation intact  -               User Key  (r) = Recorded By, (t) = Taken By, (c) = Cosigned By      Initials Name Provider Type    Kaylene Carrillo PT Physical Therapist                   Goals/Plan       Row Name 06/17/24 1558          Bed Mobility Goal 1 (PT)    Activity/Assistive Device (Bed Mobility Goal 1, PT) sit to supine/supine to sit  -      [place] Level/Cues Needed (Bed Mobility Goal 1, PT) standby assist  -KE     Time Frame (Bed Mobility Goal 1, PT) short term goal (STG);5 days  -KE     Strategies/Barriers (Bed Mobility Goal 1, PT) HOB flat  -KE     Progress/Outcomes (Bed Mobility Goal 1, PT) new goal  -KE       Row Name 06/17/24 1553          Transfer Goal 1 (PT)    Activity/Assistive Device (Transfer Goal 1, PT) sit-to-stand/stand-to-sit;bed-to-chair/chair-to-bed  -KE     [place] Level/Cues Needed (Transfer Goal 1, PT) standby assist  -KE     Time Frame (Transfer Goal 1, PT) long term goal (LTG);10 days  -KE     Progress/Outcome (Transfer Goal 1, PT) new goal  -KE       Row Name 06/17/24 1553          Gait Training Goal 1 (PT)    Activity/Assistive Device (Gait Training Goal 1, PT) gait (walking locomotion);improve balance and speed;increase endurance/gait distance;assistive device use;decrease fall risk  -KE     [place] Level (Gait Training Goal 1, PT) standby assist  -KE     Distance (Gait Training Goal 1, PT) 150'  -KE     Time Frame (Gait Training Goal 1, PT) long term goal (LTG);10 days  -KE       Row Name 06/17/24 1553          Stairs Goal 1 (PT)    Activity/Assistive Device (Stairs Goal 1, PT) stairs, all skills;improve balance and speed  -KE     [place] Level/Cues Needed (Stairs Goal 1, PT) standby assist  -KE     Number of Stairs (Stairs Goal 1, PT) 1  -KE     Time Frame (Stairs Goal 1, PT) long term goal (LTG);10 days  -KE     Progress/Outcome (Stairs Goal 1, PT) new goal  -KE       Row Name 06/17/24 1553          Problem Specific Goal 1 (PT)    Time Frame (Problem Specific Goal 1, PT) long-term goal (LTG);other (see comments)  10 days  -KE     Progress/Outcome (Problem Specific Goal 1, PT) new goal  -KE       Row Name 06/17/24 1553          Therapy Assessment/Plan (PT)    Planned Therapy Interventions (PT) balance training;bed mobility training;gait training;home exercise program;postural re-education;patient/family  education;neuromuscular re-education;ROM (range of motion);stair training;strengthening;stretching;transfer training;motor coordination training  -KE               User Key  (r) = Recorded By, (t) = Taken By, (c) = Cosigned By      Initials Name Provider Type    Kaylene Carrillo, PT Physical Therapist                   Clinical Impression       Row Name 06/17/24 1549          Pain    Pretreatment Pain Rating 0/10 - no pain  -KE     Posttreatment Pain Rating 0/10 - no pain  -KE     Pain Intervention(s) Ambulation/increased activity;Repositioned  -       Row Name 06/17/24 1549          Plan of Care Review    Plan of Care Reviewed With patient;family  -     Outcome Evaluation PT eval complete. Pt presents with balance deficits, weakness, mild coordination deficits, and decreased activity tolerance leading to impairments in functional mobility below baseline. Pt amb 30'+30' w/ B UE support on tripod monitor; MiNAx1. Pt will benefit from skilled IP PT to address impairments and return to PLOF. PT rec IRF at d/c.  -       Row Name 06/17/24 1549          Therapy Assessment/Plan (PT)    Patient/Family Therapy Goals Statement (PT) return to PLOF  -KE     Rehab Potential (PT) good, to achieve stated therapy goals  -     Criteria for Skilled Interventions Met (PT) yes  -KE     Therapy Frequency (PT) daily  -KE     Predicted Duration of Therapy Intervention (PT) 10 days  -       Row Name 06/17/24 1549          Vital Signs    Pre Systolic BP Rehab 136  -KE     Pre Treatment Diastolic BP 65  -KE     Post Systolic BP Rehab 150  -KE     Post Treatment Diastolic BP 74  -KE     Pretreatment Heart Rate (beats/min) 68  -KE     Posttreatment Heart Rate (beats/min) 67  -KE     Pre SpO2 (%) 95  -KE     O2 Delivery Pre Treatment nasal cannula  -KE     O2 Delivery Intra Treatment nasal cannula  -KE     Post SpO2 (%) 95  -KE     O2 Delivery Post Treatment nasal cannula  -KE     Pre Patient Position Supine  -KE     Intra Patient  Position Standing  -KE     Post Patient Position Sitting  -KE       Row Name 06/17/24 1549          Positioning and Restraints    Pre-Treatment Position in bed  -KE     Post Treatment Position chair  -KE     In Chair notified nsg;reclined;call light within reach;encouraged to call for assist;exit alarm on;with family/caregiver;waffle cushion;legs elevated  -KE               User Key  (r) = Recorded By, (t) = Taken By, (c) = Cosigned By      Initials Name Provider Type    Kaylene Carrillo, PT Physical Therapist                   Outcome Measures       Row Name 06/17/24 1554 06/17/24 0800       How much help from another person do you currently need...    Turning from your back to your side while in flat bed without using bedrails? 3  -KE 4  -CD    Moving from lying on back to sitting on the side of a flat bed without bedrails? 3  -KE 3  -CD    Moving to and from a bed to a chair (including a wheelchair)? 3  -KE 2  -CD    Standing up from a chair using your arms (e.g., wheelchair, bedside chair)? 3  -KE 2  -CD    Climbing 3-5 steps with a railing? 2  -KE 2  -CD    To walk in hospital room? 3  -KE 2  -CD    AM-PAC 6 Clicks Score (PT) 17  -KE 15  -CD    Highest Level of Mobility Goal 5 --> Static standing  -KE 4 --> Transfer to chair/commode  -CD      Row Name 06/17/24 1554 06/17/24 1547       Modified Pershing Scale    Pre-Stroke Modified Rosanne Scale 6 - Unable to determine (UTD) from the medical record documentation  -KE 6 - Unable to determine (UTD) from the medical record documentation  -KL    Modified Pershing Scale 3 - Moderate disability.  Requiring some help, but able to walk without assistance.  -KE 3 - Moderate disability.  Requiring some help, but able to walk without assistance.  -KL      Row Name 06/17/24 1554 06/17/24 1547       Functional Assessment    Outcome Measure Options AM-PAC 6 Clicks Basic Mobility (PT);Modified Rosanne  -KE AM-PAC 6 Clicks Daily Activity (OT);Modified Pershing  -KL              User  Key  (r) = Recorded By, (t) = Taken By, (c) = Cosigned By      Initials Name Provider Type    CD Tyrel Hatfield, RN Registered Nurse    Kaylene Carrillo, PT Physical Therapist    Janny Pena OT Occupational Therapist                                 Physical Therapy Education       Title: PT OT SLP Therapies (In Progress)       Topic: Physical Therapy (In Progress)       Point: Mobility training (Done)       Learning Progress Summary             Patient Acceptance, E, VU by RISA at 6/17/2024 1407                         Point: Home exercise program (Not Started)       Learner Progress:  Not documented in this visit.              Point: Body mechanics (Done)       Learning Progress Summary             Patient Acceptance, E, VU by RISA at 6/17/2024 1407                         Point: Precautions (Done)       Learning Progress Summary             Patient Acceptance, E, VU by RISA at 6/17/2024 1407                                         User Key       Initials Effective Dates Name Provider Type Discipline    RISA 11/16/23 -  Kaylene Slater, PT Physical Therapist PT                  PT Recommendation and Plan  Planned Therapy Interventions (PT): balance training, bed mobility training, gait training, home exercise program, postural re-education, patient/family education, neuromuscular re-education, ROM (range of motion), stair training, strengthening, stretching, transfer training, motor coordination training  Plan of Care Reviewed With: patient, family  Outcome Evaluation: PT eval complete. Pt presents with balance deficits, weakness, mild coordination deficits, and decreased activity tolerance leading to impairments in functional mobility below baseline. Pt amb 30'+30' w/ B UE support on tripod monitor; MiNAx1. Pt will benefit from skilled IP PT to address impairments and return to PLOF. PT rec IRF at d/c.     Time Calculation:   PT Evaluation Complexity  History, PT Evaluation Complexity: 3 or more personal factors  and/or comorbidities  Examination of Body Systems (PT Eval Complexity): total of 3 or more elements  Clinical Presentation (PT Evaluation Complexity): evolving  Clinical Decision Making (PT Evaluation Complexity): moderate complexity  Overall Complexity (PT Evaluation Complexity): moderate complexity     PT Charges       Row Name 06/17/24 1556             Time Calculation    Start Time 1407  -KE      PT Received On 06/17/24  -KE      PT Goal Re-Cert Due Date 06/27/24  -KE         Untimed Charges    PT Eval/Re-eval Minutes 55  -KE         Total Minutes    Untimed Charges Total Minutes 55  -KE       Total Minutes 55  -KE                User Key  (r) = Recorded By, (t) = Taken By, (c) = Cosigned By      Initials Name Provider Type    Kaylene Carrillo PT Physical Therapist                  Therapy Charges for Today       Code Description Service Date Service Provider Modifiers Qty    [id number] HC PT EVAL MOD COMPLEXITY 4 6/17/2024 Kaylene Slater PT GP 1            PT G-Codes  Outcome Measure Options: AM-PAC 6 Clicks Basic Mobility (PT), Modified McMullen  AM-PAC 6 Clicks Score (PT): 17  AM-PAC 6 Clicks Score (OT): 15  Modified McMullen Scale: 3 - Moderate disability.  Requiring some help, but able to walk without assistance.  PT Discharge Summary  Anticipated Discharge Disposition (PT): inpatient rehabilitation facility    Kaylene Slater PT  6/17/2024

## 2024-06-17 NOTE — PROGRESS NOTES
Pulmonary/Critical Care Follow-up     LOS: 0 days   Patient Care Team:  Yung Marie MD as PCP - General (Internal Medicine)    Chief Complaint: Strokelike symptoms      Subjective     History reviewed and updated in EMR as indicated.    Interval History:     Patient is doing well.  She continues to have significant expressive aphasia but follows commands.  No fevers or chills.  No other complaints.    History taken from: Chart, staff.  History limited from patient due to expressive aphasia.    PMH/FH/Social History were reviewed and updated appropriately in the electronic medical record.     Review of Systems:    Review of 14 systems was completed with positives and pertinent negatives noted in the subjective section.  All other systems reviewed and are negative.   Exceptions are noted below:    Limited secondary to expressive aphasia.      Objective     Vital Signs  Temp:  [98 °F (36.7 °C)-98.3 °F (36.8 °C)] 98 °F (36.7 °C)  Heart Rate:  [61-90] 67  Resp:  [16-18] 16  BP: (135-182)/() 150/69  06/16 0701 - 06/17 0700  In: -   Out: 600 [Urine:600]  Body mass index is 27.81 kg/m².     IV drips:  Pharmacy to Dose enoxaparin (LOVENOX)       Physical Exam:     Constitutional:   Alert, in no acute distress   Head:   Normocephalic, atraumatic   Eyes:           Lids and lashes normal, conjunctivae and sclerae normal.  PER   ENMT:  Ears appear intact with no abnormalities noted     Lips normal.     Neck:  Trachea midline, no JVD   Lungs/Resp:    Normal effort, symmetric chest rise, no crepitus, clear to      auscultation bilaterally.               Heart/CV:   Regular rhythm and normal rate, no murmur   Abdomen/GI:    Soft, nontender, nondistended   :    Deferred   Extremities/MSK:  No clubbing or cyanosis.  No edema.     Pulses:  Pulses palpable and equal bilaterally   Skin:  No bleeding, bruising or rash   Heme/Lymph:  No cervical or supraclavicular adenopathy.   Neurologic:      Psychiatric:    Moves all  extremities with no obvious focal motor deficit.  Cranial nerves 2 - 12 grossly intact.  Significant expressive aphasia.  Non-agitated, normal affect.    The above physical exam findings were reviewed and reflect my exam findings as of today's exam.   Electronically signed by:  John Marroquin MD  06/17/24  14:15 EDT      Results Review:     I reviewed the patient's new clinical results.   Results from last 7 days   Lab Units 06/17/24  0526   SODIUM mmol/L 138   POTASSIUM mmol/L 3.6   CHLORIDE mmol/L 97*   CO2 mmol/L 30.0*   BUN mg/dL 11   CREATININE mg/dL 0.60   CALCIUM mg/dL 8.9   GLUCOSE mg/dL 105*     Results from last 7 days   Lab Units 06/17/24  0526   WBC 10*3/mm3 11.85*   HEMOGLOBIN g/dL 12.5   HEMATOCRIT % 37.0   PLATELETS 10*3/mm3 291         Results from last 7 days   Lab Units 06/17/24  0526   MAGNESIUM mg/dL 1.4*       I reviewed the patient's new imaging including images and reports.    MRI brain 6/16/2024:  Impression:  1.Acute/subacute infarct in the posterior left frontal lobe.  2.Minimal chronic small vessel ischemic change.  3.Small left mastoid effusion.    MRV brain 6/16/2024:    IMPRESSION:  Impression:  No evidence of dural venous sinus thrombosis.    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 Otherwise no vessel stenosis or occlusion is appreciated.       Medication Review:   aspirin, 81 mg, Oral, Daily   Or  aspirin, 300 mg, Rectal, Daily  atorvastatin, 80 mg, Oral, Nightly  [START ON 6/18/2024] clopidogrel, 75 mg, Oral, Daily  enoxaparin, 40 mg, Subcutaneous, Daily  magnesium sulfate, 2 g, Intravenous, Q2H  [START ON 6/18/2024] pantoprazole, 40 mg, Oral, Q AM  potassium chloride ER, 40 mEq, Oral, Q4H  senna-docusate sodium, 2 tablet, Oral, BID  sodium chloride, 10 mL, Intravenous, Q12H      Pharmacy to Dose enoxaparin (LOVENOX),         Assessment & Plan         Acute CVA (cerebrovascular accident)    Essential hypertension    Basilar artery aneurysm     Stroke    73 y.o. lifetime non-smoker with history of hypertension, hyperlipidemia, anxiety and occasional Xanax use, admitted to the ICU on 6/16/2024 after transfer from an outside facility for presumptive diagnosis of CVA.  Patient was normal on the morning of 6/16/2024 at 10 AM.  She took a nap and woke up at noon with right-sided weakness and slurred speech that improved transiently.  She went to her local hospital where initial NIH was 1.  She had hypertension treated with Cardene and subsequently had worsening NIH stroke scale to 10.  Cardene was discontinued.  There was some concern on outside imaging of possible thrombus in the left sigmoid sinus and partially in the left transverse sinus.  Patient was transferred to [facility] where an MRA/MRV and CT venogram showed a 4 mm basilar artery tip aneurysm but no evidence of venous sinus thrombosis.  MRI brain did show a left posterior frontal acute infarct.    Patient is doing okay.  No worsening.  Neurology plans repeat MRI.    Plan:  1.  For acute CVA: Continue aspirin/statin/Plavix.  Neurology following.  PT/OT/speech therapy.  Stroke order set.  2.  For hypertension: Allowing permissive hypertension with goal BP less than 220/110 per neurology.  3.  For basilar artery 0.4 cm aneurysm: Plan to follow-up as outpatient in neurology clinic.  4.  DVT prophylaxis: Lovenox.    Patient is critically ill secondary to tenuous neurologic status in the setting of waxing/waning neurologic exam associated with blood pressure and has high risk of life-threatening decompensation in condition, which can include respiratory failure or worsening/permanent neurologic damage.   As such, the patient requires continuous monitoring and frequent reassessment for consideration of adjustment in management to minimize this risk.  I personally reassessed the patient multiple times today.    Critical care time : 36 minutes spent by me personally and independently.(This excludes time  spent performing separately reportable procedures and services).  Critical care time includes high complexity decision making to assess, manipulate, and support vital organ system failure in this individual who has impairment of one or more vital organ systems such that there is a high probability of imminent or life threatening deterioration in the patient’s condition.    Case discussed with Dr. Hinojosa.    Electronically signed by:    John Marroquin MD  06/17/24  14:15 EDT      *. Please note that portions of this note were completed with [employer] - a voice recognition program.

## 2024-06-17 NOTE — PLAN OF CARE
Goal Outcome Evaluation: A&Ox4. Able to answer appropriately with nods. TORRES's. Severe aphasia but now able to say name and some words. Also communicating through writing. Right facial droop. No witnessed seizure activity. No c/o headache or nausea.         Problem: Adult Inpatient Plan of Care  Goal: Absence of Hospital-Acquired Illness or Injury  Intervention: Identify and Manage Fall Risk  Recent Flowsheet Documentation  Taken 6/17/2024 1800 by Tyrel Hatfield RN  Safety Promotion/Fall Prevention:   safety round/check completed   activity supervised  Taken 6/17/2024 1600 by Tyrel Hatfield RN  Safety Promotion/Fall Prevention:   safety round/check completed   activity supervised  Taken 6/17/2024 1400 by Tyrel Hatfield RN  Safety Promotion/Fall Prevention:   safety round/check completed   activity supervised  Taken 6/17/2024 1200 by Tyrel Hatfield RN  Safety Promotion/Fall Prevention:   safety round/check completed   activity supervised  Taken 6/17/2024 1000 by Tyrel Hatfield RN  Safety Promotion/Fall Prevention:   safety round/check completed   activity supervised  Taken 6/17/2024 0800 by Tyrel Hatfield RN  Safety Promotion/Fall Prevention:   safety round/check completed   activity supervised     Problem: Adult Inpatient Plan of Care  Goal: Absence of Hospital-Acquired Illness or Injury  Intervention: Prevent Skin Injury  Recent Flowsheet Documentation  Taken 6/17/2024 1800 by Tyrel Hatfield RN  Body Position: position changed independently  Taken 6/17/2024 1600 by Tyrel Hatfield RN  Body Position: position changed independently  Skin Protection:   tubing/devices free from skin contact   skin-to-skin areas padded   skin-to-device areas padded   incontinence pads utilized  Taken 6/17/2024 1400 by Tyrel Hatfield RN  Body Position: position changed independently  Taken 6/17/2024 1200 by Tyrel Hatfield RN  Body Position: position changed independently  Skin Protection:   tubing/devices free from skin  contact   transparent dressing maintained   drying agents applied   incontinence pads utilized  Taken 6/17/2024 1000 by Tyrel Hatfield RN  Body Position: position changed independently  Taken 6/17/2024 0800 by Tyrel Hatfield RN  Body Position: position changed independently  Skin Protection:   tubing/devices free from skin contact   skin-to-skin areas padded   skin-to-device areas padded   incontinence pads utilized     Problem: Adult Inpatient Plan of Care  Goal: Absence of Hospital-Acquired Illness or Injury  Intervention: Prevent and Manage VTE (Venous Thromboembolism) Risk  Recent Flowsheet Documentation  Taken 6/17/2024 1800 by Tyrel Hatfield RN  Activity Management:   up to bedside commode   back to bed  Taken 6/17/2024 1600 by Tyrel Hatfield RN  Activity Management: back to bed  VTE Prevention/Management: sequential compression devices off  Range of Motion: active ROM (range of motion) encouraged  Taken 6/17/2024 1400 by Tyrel Hatfield RN  Activity Management: up in chair  Taken 6/17/2024 1200 by Tyrel Hatfield RN  Activity Management: dorsiflexion/plantar flexion performed  VTE Prevention/Management:   sequential compression devices on   bilateral  Taken 6/17/2024 1000 by Tyrel Hatfield RN  Activity Management: dorsiflexion/plantar flexion performed  Taken 6/17/2024 0800 by Tyrel Hatfield RN  Activity Management: dorsiflexion/plantar flexion performed  VTE Prevention/Management:   sequential compression devices on   bilateral  Range of Motion: active ROM (range of motion) encouraged     Problem: Adult Inpatient Plan of Care  Goal: Absence of Hospital-Acquired Illness or Injury  Intervention: Prevent Infection  Recent Flowsheet Documentation  Taken 6/17/2024 1800 by Tyrel Hatfield RN  Infection Prevention:   environmental surveillance performed   hand hygiene promoted  Taken 6/17/2024 1600 by Tyrel Hatfield RN  Infection Prevention:   environmental surveillance performed   hand hygiene  promoted  Taken 6/17/2024 1400 by Tyrel Hatfield RN  Infection Prevention:   environmental surveillance performed   hand hygiene promoted  Taken 6/17/2024 1200 by yTrel Hatfield RN  Infection Prevention:   environmental surveillance performed   hand hygiene promoted  Taken 6/17/2024 1000 by Tyrel Hatfield RN  Infection Prevention:   environmental surveillance performed   hand hygiene promoted  Taken 6/17/2024 0800 by Tyrel Hatfield RN  Infection Prevention:   environmental surveillance performed   hand hygiene promoted     Problem: Skin Injury Risk Increased  Goal: Skin Health and Integrity  Intervention: Optimize Skin Protection  Recent Flowsheet Documentation  Taken 6/17/2024 1800 by Tyrel Hatfield RN  Head of Bed (HOB) Positioning: HOB at 30-45 degrees  Taken 6/17/2024 1600 by Tyrel Hatfield RN  Pressure Reduction Techniques:   frequent weight shift encouraged   weight shift assistance provided  Head of Bed (HOB) Positioning: HOB at 30-45 degrees  Pressure Reduction Devices: pressure-redistributing mattress utilized  Skin Protection:   tubing/devices free from skin contact   skin-to-skin areas padded   skin-to-device areas padded   incontinence pads utilized  Taken 6/17/2024 1400 by Tyrel Hatfield RN  Head of Bed (HOB) Positioning: HOB at 30-45 degrees  Taken 6/17/2024 1200 by Tyrel Hatfield RN  Pressure Reduction Techniques:   frequent weight shift encouraged   weight shift assistance provided  Head of Bed (HOB) Positioning: HOB at 30 degrees  Pressure Reduction Devices: pressure-redistributing mattress utilized  Skin Protection:   tubing/devices free from skin contact   transparent dressing maintained   drying agents applied   incontinence pads utilized  Taken 6/17/2024 1000 by Tyrel Hatfield RN  Head of Bed (HOB) Positioning: HOB at 30 degrees  Taken 6/17/2024 0800 by Tyrel Hatfield RN  Pressure Reduction Techniques: frequent weight shift encouraged  Head of Bed (HOB) Positioning: HOB at 30  degrees  Pressure Reduction Devices: pressure-redistributing mattress utilized  Skin Protection:   tubing/devices free from skin contact   skin-to-skin areas padded   skin-to-device areas padded   incontinence pads utilized     Problem: Communication Impairment (Stroke, Ischemic/Transient Ischemic Attack)  Goal: Improved Communication Skills  Intervention: Optimize Communication Skills  Recent Flowsheet Documentation  Taken 6/17/2024 1600 by Tyrel Hatfield RN  Communication Enhancement Strategies:   call light answered in person   communication board used   extra time allowed for response   nonverbal strategies used   one-step directions provided   repetition utilized  Taken 6/17/2024 1200 by Tyrel Hatfield RN  Communication Enhancement Strategies:   call light answered in person   communication board used   extra time allowed for response   nonverbal strategies used   one-step directions provided   repetition utilized  Taken 6/17/2024 0800 by Tyrel Hatfield RN  Communication Enhancement Strategies:   call light answered in person   extra time allowed for response   nonverbal strategies used   one-step directions provided   repetition utilized     Problem: Functional Ability Impaired (Stroke, Ischemic/Transient Ischemic Attack)  Goal: Optimal Functional Ability  Intervention: Optimize Functional Ability  Recent Flowsheet Documentation  Taken 6/17/2024 1800 by Tyrel Hatfield RN  Activity Management:   up to bedside commode   back to bed  Taken 6/17/2024 1600 by Tyrel Hatfield RN  Activity Management: back to bed  Taken 6/17/2024 1400 by Tyrel Hatfield RN  Activity Management: up in chair  Taken 6/17/2024 1200 by Tyrel Hatfield RN  Activity Management: dorsiflexion/plantar flexion performed  Taken 6/17/2024 1000 by Tyrel Hatfield RN  Activity Management: dorsiflexion/plantar flexion performed  Taken 6/17/2024 0800 by Tyrel Hatfield RN  Activity Management: dorsiflexion/plantar flexion performed      Problem: Swallowing Impairment (Stroke, Ischemic/Transient Ischemic Attack)  Goal: Optimal Eating and Swallowing without Aspiration  Intervention: Optimize Eating and Swallowing  Recent Flowsheet Documentation  Taken 6/17/2024 1600 by Tyrel Hatfield RN  Aspiration Precautions: awake/alert before oral intake  Taken 6/17/2024 1400 by Tyrel Hatfield, RN  Aspiration Precautions: awake/alert before oral intake  Taken 6/17/2024 1200 by Tyrel Hatfield, RN  Aspiration Precautions:   awake/alert before oral intake   respiratory status monitored  Taken 6/17/2024 0800 by Tyrel Hatfield, RN  Aspiration Precautions: awake/alert before oral intake

## 2024-06-17 NOTE — PLAN OF CARE
Goal Outcome Evaluation:  Plan of Care Reviewed With: patient, family        Progress: no change  Outcome Evaluation: Pt presents to OT evaluaiton w/ deficits in RUE dexterity/grasp, functional endurance, self-care and mild balance impairment. Pt would benefit from IPOT services to address deficits in order to progress towards PLOF. d/c rec is IPR at this time, will monitor closely.      Anticipated Discharge Disposition (OT): inpatient rehabilitation facility

## 2024-06-17 NOTE — CONSULTS
Diabetes Education    Patient Name:  Aminah Calderon  YOB: 1950  MRN: [id number]  Admit Date:  6/16/2024      Order criteria not met for diabetes education consult. Current A1c is 5.5, noted during chart review. Pt has no history of DM and no home meds for DM. Thank you.        Electronically signed by:  Shelby Yoo RN  06/17/24 08:28 EDT

## 2024-06-17 NOTE — CASE MANAGEMENT/SOCIAL WORK
Discharge Planning Assessment  [facility]     Patient Name: Aminah Calderon  MRN: [id number]  Today's Date: 6/17/2024    Admit Date: 6/16/2024    Plan: IDP   Discharge Needs Assessment       Row Name 06/17/24 1156       Living Environment    People in Home spouse    Current Living Arrangements home    Potentially Unsafe Housing Conditions unable to assess    Primary Care Provided by self    Provides Primary Care For spouse    Family Caregiver if Needed child(venessa), adult    Family Caregiver Names Jose Work, son    Quality of Family Relationships involved;helpful;supportive       Resource/Environmental Concerns    Resource/Environmental Concerns none    Transportation Concerns none       Transportation Needs    In the past 12 months, has lack of transportation kept you from medical appointments or from getting medications? no    In the past 12 months, has lack of transportation kept you from meetings, work, or from getting things needed for daily living? No       Food Insecurity    Within the past 12 months, you worried that your food would run out before you got the money to buy more. Never true    Within the past 12 months, the food you bought just didn't last and you didn't have money to get more. Never true       Transition Planning    Patient/Family Anticipates Transition to home with family;home with help/services;inpatient rehabilitation facility    Patient/Family Anticipated Services at Transition     Transportation Anticipated family or friend will provide       Discharge Needs Assessment    Equipment Currently Used at Home none    Concerns to be Addressed discharge planning                   Discharge Plan       Row Name 06/17/24 1157       Plan    Plan IDP    Patient/Family in Agreement with Plan yes    Plan Comments Spoke with patient and her son, Jose Calderon, at bedside to initiate discharge planning.  Confirmed patient and spouse reside in [facility]; PCP is Abdon Marie in [place];  insurance is [employer].  Patient reported to be independent with ADLs and mobility prior to admission.  Patient currently using no DME or HH.  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  Discharge plan is ongoing.  CM will continue to follow.                  Continued Care and Services - Admitted Since 6/16/2024    No active coordination exists for this encounter.          Demographic Summary       Row Name 06/17/24 1154       General Information    Referral Source admission list    Reason for Consult discharge planning    Preferred Language English       Contact Information    Permission Granted to Share Info With                    Functional Status       Row Name 06/17/24 9021       Functional Status    Usual Activity Tolerance good       Functional Status, IADL    Medications independent    Meal Preparation independent    Housekeeping independent    Laundry independent    Shopping independent                   Psychosocial    No documentation.                  Abuse/Neglect    No documentation.                  Legal    No documentation.                  Substance Abuse    No documentation.                  Patient Forms    No documentation.                     Jessica Montgomery RN

## 2024-06-17 NOTE — H&P
CRITICAL CARE ADMISSION NOTE    Chief Complaint     Acute CVA (cerebrovascular accident)    History of Present Illness     73-year-old female admitted to the intensive care unit on 6/16/2024 in transfer from an outside hospital for presumptive diagnosis of CVA    Patient has no history of prior neurologic event.  Past history of hypertension hyperlipidemia.  Last known well at 10 AM.  Woke up at around noon with right-sided weakness and slurred speech that improved transiently.  She went to her local hospital where she had an NIH stroke scale of 1.  She had hypertension treated with Cardene and subsequently had worsening NIH stroke scale that increased to 10.  Cardene was discontinued.  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    She was transferred to [facility] where on examination she has persistent dysarthria and right upper extremity weakness.  No past history of CVA or other neurologic deficit.    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    Patient occasionally takes Xanax for anxiety  Non-smoker and denies illicit drug use    Problem List, Surgical History, Family, Social History, and ROS     Acute CVA (cerebrovascular accident)    Essential hypertension    Basilar artery aneurysm     History reviewed. No pertinent surgical history.    No Known Allergies  No current facility-administered medications on file prior to encounter.     No current outpatient medications on file prior to encounter.     MEDICATION LIST AND ALLERGIES REVIEWED.    History reviewed. No pertinent family history.  Social History     Tobacco Use    Smoking status: Former     Types: Cigarettes   Substance Use Topics    Alcohol use: Not Currently    Drug use: Never     Social History     Social History Narrative    Not on file  "    FAMILY AND SOCIAL HISTORY REVIEWED.    Review of Systems  AS ABOVE OR ALL OTHER SYSTEMS REVIEWED AND ARE NEGATIVE.    Physical Exam and Clinical Information   There were no vitals taken for this visit.  Physical Exam:   GENERAL: Awake, no distress   HEENT: No adenopathy.  No evidence of trauma.  Pupils equal   LUNGS: No wheezes or rhonchi   HEART: Regular rate and rhythm without murmurs   GI: Soft, nontender   EXTREMITIES: No clubbing, cyanosis, or edema.  Palpable pulses   NEURO/PSYCH: Awake and alert.  Attempts to respond to questions appropriately.  Dysarthric.  Right upper extremity grasp weakness.  NIH stroke scale 9              Invalid input(s): \"CHLORIDE\"  CrCl cannot be calculated (No successful lab value found.).          No results found for: \"LACTATE\"     IMAGES: No LVO on CT angiogram.  No acute intracranial abnormality    I reviewed the patient's results and images.     Assesment     Active Hospital Problems    Diagnosis     **Acute CVA (cerebrovascular accident)     Essential hypertension     Basilar artery aneurysm      Plan/Recommendations     Admit to the intensive care unit  Antiplatelet therapy for now  MRI/MRV and anticoagulate if clot  Blood pressure control  Serial neurologic exam  Post CVA order set    Discussed with the patient and her family at the bedside    Critical Care time spent in direct patient care: 35 minutes (excluding procedure time, if applicable) including high complexity decision making to assess, manipulate, and support vital organ system failure in this individual who has impairment of one or more vital organ systems such that there is a high probability of imminent or life threatening deterioration in the patient’s condition.    FADIA Engel MD  Pulmonary and Critical Care Medicine     CC: Provider, No Known  "

## 2024-06-17 NOTE — PLAN OF CARE
Goal Outcome Evaluation:  Plan of Care Reviewed With: patient, family                  Anticipated Discharge Disposition (SLP): inpatient rehabilitation facility    SLP Diagnosis: severe, aphasia (06/17/24 0830)  SLP Diagnosis Comments: Severe expressive aphasia. Simple auditory comprehension functional. Will continue higher level assessment at next session. (06/17/24 0830)  SLP Swallowing Diagnosis: mild-moderate, oral dysphagia (06/17/24 0830)

## 2024-06-17 NOTE — THERAPY EVALUATION
Patient Name: Aminah Calderon  : 1950    MRN: [id number]                              Today's Date: 2024       Admit Date: 2024    Visit Dx:     ICD-10-CM ICD-9-CM   1. Oral phase dysphagia  R13.11 787.21   2. Aphasia  R47.01 784.3     Patient Active Problem List   Diagnosis    Acute CVA (cerebrovascular accident)    Essential hypertension    Basilar artery aneurysm    Stroke     History reviewed. No pertinent past medical history.  History reviewed. No pertinent surgical history.   General Information       Row Name 24 1538          OT Time and Intention    Document Type evaluation  -     Mode of Treatment occupational therapy  -       Row Name 24 1538          General Information    Patient Profile Reviewed yes  -KL     Prior Level of Function independent:;all household mobility;community mobility;gait;transfer;w/c or scooter;ADL's  Caregiver for SO  -KL     Existing Precautions/Restrictions fall;oxygen therapy device and L/min  Expressive aphasia; can answer yes/no  -     Barriers to Rehab medically complex  -       Row Name 24 1538          Living Environment    People in Home spouse  -[facility] Name 24 1538          Home Main Entrance    Number of Stairs, Main Entrance one  -KL     Stair Railings, Main Entrance none  -       Row Name 24 1538          Stairs Within Home, Primary    Stairs, Within Home, Primary Multilevel home, can reside on main level  -       Row Name 24 1538          Cognition    Orientation Status (Cognition) oriented x 3  -[facility] Name 24 1538          Safety Issues, Functional Mobility    Safety Issues Affecting Function (Mobility) insight into deficits/self-awareness;judgment;safety precaution awareness;safety precautions follow-through/compliance  -     Impairments Affecting Function (Mobility) balance;coordination;endurance/activity tolerance;grasp;strength  -               User Key  (r) = Recorded By, (t) =  Taken By, (c) = Cosigned By      Initials Name Provider Type    Janny Pena OT Occupational Therapist                     Mobility/ADL's       Row Name 06/17/24 1539          Bed Mobility    Bed Mobility sit-supine;supine-sit  -     Supine-Sit English (Bed Mobility) minimum assist (75% patient effort);1 person assist  -     Assistive Device (Bed Mobility) bed rails;head of bed elevated;draw sheet  -       Row Name 06/17/24 1539          Transfers    Transfers bed-chair transfer;sit-stand transfer;stand-sit transfer  -       Row Name 06/17/24 1539          Bed-Chair Transfer    Bed-Chair English (Transfers) minimum assist (75% patient effort);verbal cues  -     Assistive Device (Bed-Chair Transfers) other (see comments)  UE support  -       Row Name 06/17/24 1539          Sit-Stand Transfer    Sit-Stand English (Transfers) verbal cues;1 person assist;minimum assist (75% patient effort)  -     Assistive Device (Sit-Stand Transfers) other (see comments)  UE support  -       Row Name 06/17/24 1539          Stand-Sit Transfer    Stand-Sit English (Transfers) contact guard;verbal cues;1 person assist  -     Assistive Device (Stand-Sit Transfers) other (see comments)  UE support  -       Row Name 06/17/24 1539          Activities of Daily Living    BADL Assessment/Intervention lower body dressing  -       Row Name 06/17/24 1539          Lower Body Dressing Assessment/Training    English Level (Lower Body Dressing) moderate assist (50% patient effort);don;socks  -     Position (Lower Body Dressing) edge of bed sitting  -               User Key  (r) = Recorded By, (t) = Taken By, (c) = Cosigned By      Initials Name Provider Type    Janny Pena OT Occupational Therapist                   Obj/Interventions       Row Name 06/17/24 1542          Sensory Assessment (Somatosensory)    Sensory Assessment (Somatosensory) UE sensation intact  -       Row Name 06/17/24 1542           Range of Motion Comprehensive    General Range of Motion bilateral upper extremity ROM WFL  -[facility] Name 06/17/24 1542          Strength Comprehensive (MMT)    Comment, General Manual Muscle Testing (MMT) Assessment RUE MMT grossly 3/5; LUE grossly 4/5; R hand dominant; R /dexterity mod impairment  -[facility] Name 06/17/24 1542          Motor Skills    Motor Skills coordination  -     Coordination right;upper extremity;fine motor deficit;moderate impairment  -KL       Row Name 06/17/24 1542          Balance    Balance Assessment sitting static balance;sitting dynamic balance;sit to stand dynamic balance;standing static balance;standing dynamic balance  -     Static Sitting Balance supervision  -     Position, Sitting Balance supported;sitting edge of bed  -     Static Standing Balance contact guard  -     Dynamic Standing Balance minimal assist  -     Position/Device Used, Standing Balance supported  -     Balance Interventions sitting;standing;sit to stand;supported;static;dynamic;occupation based/functional task  -               User Key  (r) = Recorded By, (t) = Taken By, (c) = Cosigned By      Initials Name Provider Type     Janny Moss OT Occupational Therapist                   Goals/Plan       Row Name 06/17/24 1546          Bed Mobility Goal 1 (OT)    Activity/Assistive Device (Bed Mobility Goal 1, OT) sit to supine;supine to sit  -     [place] Level/Cues Needed (Bed Mobility Goal 1, OT) modified independence  -     Time Frame (Bed Mobility Goal 1, OT) short term goal (STG);5 days  -     Progress/Outcomes (Bed Mobility Goal 1, OT) new goal  -KL       Row Name 06/17/24 1546          Transfer Goal 1 (OT)    Activity/Assistive Device (Transfer Goal 1, OT) sit-to-stand/stand-to-sit;toilet  -     [place] Level/Cues Needed (Transfer Goal 1, OT) contact guard required  -     Time Frame (Transfer Goal 1, OT) long term goal (LTG);10 days  -      Progress/Outcome (Transfer Goal 1, OT) new goal  -       Row Name 06/17/24 1546          Self-Feeding Goal 1 (OT)    Activity/Device (Self-Feeding Goal 1, OT) liquids to mouth;scoop food and bring to mouth;adapted cup;built-up handle utensils;scoop dish/plate guard  -     [place] Level/Cues Needed (Self-Feeding Goal 1, OT) set-up required  -     Time Frame (Self-Feeding Goal 1, OT) long term goal (LTG);10 days  -       Row Name 06/17/24 1541          Therapy Assessment/Plan (OT)    Planned Therapy Interventions (OT) activity tolerance training;adaptive equipment training;functional balance retraining;occupation/activity based interventions;neuromuscular control/coordination retraining;patient/caregiver education/training;ROM/therapeutic exercise;strengthening exercise;transfer/mobility retraining  -               User Key  (r) = Recorded By, (t) = Taken By, (c) = Cosigned By      Initials Name Provider Type     Janny Moss, CORNEL Occupational Therapist                   Clinical Impression       Row Name 06/17/24 1544          Pain Assessment    Pretreatment Pain Rating 0/10 - no pain  -     Posttreatment Pain Rating 0/10 - no pain  -[facility] Name 06/17/24 1547          Plan of Care Review    Plan of Care Reviewed With patient;family  -     Progress no change  -     Outcome Evaluation Pt presents to OT evaluaiton w/ deficits in RUE dexterity/grasp, functional endurance, self-care and mild balance impairment. Pt would benefit from IPOT services to address deficits in order to progress towards PLOF. d/c rec is IPR at this time, will monitor closely.  -       Row Name 06/17/24 154          Therapy Assessment/Plan (OT)    Patient/Family Therapy Goal Statement (OT) Return to PLOF  -     Rehab Potential (OT) good, to achieve stated therapy goals  -     Criteria for Skilled Therapeutic Interventions Met (OT) yes  -     Therapy Frequency (OT) daily  -     Predicted Duration of Therapy  Intervention (OT) 10 days  -       Row Name 06/17/24 1544          Therapy Plan Review/Discharge Plan (OT)    Anticipated Discharge Disposition (OT) inpatient rehabilitation facility  -       Row Name 06/17/24 1544          Vital Signs    Pre Systolic BP Rehab 136  -KL     Pre Treatment Diastolic BP 65  -KL     Post Systolic BP Rehab 150  -KL     Post Treatment Diastolic BP 74  -KL     Pretreatment Heart Rate (beats/min) 69  -KL     Posttreatment Heart Rate (beats/min) 68  -KL     Pre SpO2 (%) 95  -KL     O2 Delivery Pre Treatment nasal cannula  -KL     Post SpO2 (%) 95  -KL     O2 Delivery Post Treatment nasal cannula  -KL     Pre Patient Position Supine  -KL     Intra Patient Position Standing  -KL     Post Patient Position Sitting  -       Row Name 06/17/24 1544          Positioning and Restraints    Pre-Treatment Position in bed  -KL     Post Treatment Position chair  -KL     In Chair notified nsg;reclined;sitting;call light within reach;encouraged to call for assist;exit alarm on;with family/caregiver;waffle cushion;RUE elevated;legs elevated;LUE elevated  -KL               User Key  (r) = Recorded By, (t) = Taken By, (c) = Cosigned By      Initials Name Provider Type    Janny Pena, CORNEL Occupational Therapist                   Outcome Measures       Row Name 06/17/24 1547          How much help from another is currently needed...    Putting on and taking off regular lower body clothing? 2  -KL     Bathing (including washing, rinsing, and drying) 2  -KL     Toileting (which includes using toilet bed pan or urinal) 2  -KL     Putting on and taking off regular upper body clothing 3  -KL     Taking care of personal grooming (such as brushing teeth) 3  -KL     Eating meals 3  -KL     AM-PAC 6 Clicks Score (OT) 15  -       Row Name 06/17/24 0800          How much help from another person do you currently need...    Turning from your back to your side while in flat bed without using bedrails? 4  -CD      Moving from lying on back to sitting on the side of a flat bed without bedrails? 3  -CD     Moving to and from a bed to a chair (including a wheelchair)? 2  -CD     Standing up from a chair using your arms (e.g., wheelchair, bedside chair)? 2  -CD     Climbing 3-5 steps with a railing? 2  -CD     To walk in hospital room? 2  -CD     AM-PAC 6 Clicks Score (PT) 15  -CD     Highest Level of Mobility Goal 4 --> Transfer to chair/commode  -CD       Row Name 06/17/24 1547          Modified [place] Scale    Pre-Stroke Modified [place] Scale 6 - Unable to determine (UTD) from the medical record documentation  -     Modified [place] Scale 3 - Moderate disability.  Requiring some help, but able to walk without assistance.  -       Row Name 06/17/24 1547          Functional Assessment    Outcome Measure Options AM-PAC 6 Clicks Daily Activity (OT);Modified [place]  -               User Key  (r) = Recorded By, (t) = Taken By, (c) = Cosigned By      Initials Name Provider Type    CD Tyrel Hatfield, RN Registered Nurse    Janny Pena OT Occupational Therapist                    Occupational Therapy Education       Title: PT OT SLP Therapies (In Progress)       Topic: Occupational Therapy (In Progress)       Point: ADL training (In Progress)       Description:   Instruct learner(s) on proper safety adaptation and remediation techniques during self care or transfers.   Instruct in proper use of assistive devices.                  Learning Progress Summary             Patient Acceptance, E, NR by MICKEY at 6/17/2024 1548   Family Acceptance, E, NR by MICKEY at 6/17/2024 1548                         Point: Home exercise program (Not Started)       Description:   Instruct learner(s) on appropriate technique for monitoring, assisting and/or progressing therapeutic exercises/activities.                  Learner Progress:  Not documented in this visit.              Point: Precautions (In Progress)       Description:   Instruct learner(s)  on prescribed precautions during self-care and functional transfers.                  Learning Progress Summary             Patient Acceptance, E, NR by  at 6/17/2024 1548   Family Acceptance, E, NR by  at 6/17/2024 1548                         Point: Body mechanics (In Progress)       Description:   Instruct learner(s) on proper positioning and spine alignment during self-care, functional mobility activities and/or exercises.                  Learning Progress Summary             Patient Acceptance, E, NR by  at 6/17/2024 1548   Family Acceptance, E, NR by  at 6/17/2024 1548                                         User Key       Initials Effective Dates Name Provider Type Discipline     02/05/24 -  Janny Moss OT Occupational Therapist OT                  OT Recommendation and Plan  Planned Therapy Interventions (OT): activity tolerance training, adaptive equipment training, functional balance retraining, occupation/activity based interventions, neuromuscular control/coordination retraining, patient/caregiver education/training, ROM/therapeutic exercise, strengthening exercise, transfer/mobility retraining  Therapy Frequency (OT): daily  Plan of Care Review  Plan of Care Reviewed With: patient, family  Progress: no change  Outcome Evaluation: Pt presents to OT evaluaiton w/ deficits in RUE dexterity/grasp, functional endurance, self-care and mild balance impairment. Pt would benefit from IPOT services to address deficits in order to progress towards PLOF. d/c rec is IPR at this time, will monitor closely.     Time Calculation:   Evaluation Complexity (OT)  Review Occupational Profile/Medical/Therapy History Complexity: expanded/moderate complexity  Assessment, Occupational Performance/Identification of Deficit Complexity: 3-5 performance deficits  Clinical Decision Making Complexity (OT): detailed assessment/moderate complexity  Overall Complexity of Evaluation (OT): moderate complexity     Time  Calculation- OT       Row Name 06/17/24 1549             Time Calculation- OT    OT Start Time 1408  -KL      OT Received On 06/17/24  -KL      OT Goal Re-Cert Due Date 06/27/24  -KL         Timed Charges    11541 - OT Therapeutic Activity Minutes 5  -KL      11010 - OT Self Care/Mgmt Minutes 10  -KL         Untimed Charges    OT Eval/Re-eval Minutes 35  -KL         Total Minutes    Timed Charges Total Minutes 15  -KL      Untimed Charges Total Minutes 35  -KL       Total Minutes 50  -KL                User Key  (r) = Recorded By, (t) = Taken By, (c) = Cosigned By      Initials Name Provider Type    Janny Pena OT Occupational Therapist                  Therapy Charges for Today       Code Description Service Date Service Provider Modifiers Qty    82877984973 HC OT SELF CARE/MGMT/TRAIN EA 15 MIN 6/17/2024 Janny Moss OT GO 1    55362664667 HC OT EVAL MOD COMPLEXITY 3 6/17/2024 Janny Moss OT GO 1                 Janny Moss OT  6/17/2024

## 2024-06-17 NOTE — PLAN OF CARE
Goal Outcome Evaluation:  Plan of Care Reviewed With: patient, son   VSS; NSR per monitor. RA- 2 L NC while asleep. Afebrile. A&Ox4, patient able to answer yes/no questions, but unable to speak related to expressive aphasia. Communication board utilized. NIH 9. Complained of nausea x2, PRN medication given with relief. Patient able to rest between care and easily awoke to voice. Family at bedside for support. No further complaints at this time.      Progress: improving

## 2024-06-17 NOTE — PROGRESS NOTES
Stroke Neurology Progress Note     Subjective     This patient was seen in follow-up for: left MCA territory infarct  Present for the encounter were: self, patient, patient's son, patient's daughter-in-law    Subjective:  My first encounter with the patient.  Admitted overnight.  Patient has complete expressive aphasia which did not correlate with a small stroke seen on MRI.  Suspect component of collateral failure versus perfusion injury.  Patient was previously on a heparin drip at [facility] for suspected CVST with goals SBP <180.  However, imaging at our hospital including CTV and MRV clarified no CVST this heparin drip was discontinued and permissive blood pressure goals.  Patient was also noted to have left eyebrow twitching thus have ordered EEG to rule out seizure which could affect patient's clinical condition.    Objective      Temp:  [98 °F (36.7 °C)-98.3 °F (36.8 °C)] 98.3 °F (36.8 °C)  Heart Rate:  [61-90] 65  Resp:  [16-18] 16  BP: (135-182)/() 144/69            Objective    Physical Exam:  General Appearance: Alert  HEENT: anicteric sclera, no scleral injection  Lungs: respirations appear comfortable, no obvious increased work of breathing  Extremities: No cyanosis or fingernail clubbing   Skin: No rashes in exposed skin areas     Neurological Examination:   Mental status: Alert.  Follows commands.  Expressive aphasia.  Mute.   Cranial Nerves: Visual fields intact. Extraocular movements intact with no nystagmus. Midline gaze.  Right facial droop.  Sensory: Normal sensory exam to light touch.  Motor: Normal tone. Absent pronator drift.  Strength:  LUE: 5/5 biceps, triceps,   LLE: 5/5 hip flexion/extension  RUE: 5/5 biceps, triceps,   RLE:  5/5 hip flexion/extension      Labs:    Lab Results   Component Value Date    HGBA1C 5.50 06/17/2024      Lab Results   Component Value Date    CHOL 185 06/17/2024    TRIG 211 (H) 06/17/2024    HDL 67 (H) 06/17/2024    LDL 83 06/17/2024       Lab Results    Component Value Date    WBC 11.85 (H) 06/17/2024    HGB 12.5 06/17/2024    HCT 37.0 06/17/2024    MCV 83.7 06/17/2024     06/17/2024     Lab Results   Component Value Date    GLUCOSE 105 (H) 06/17/2024    BUN 11 06/17/2024    CREATININE 0.60 06/17/2024    BCR 18.3 06/17/2024    CO2 30.0 (H) 06/17/2024    CALCIUM 8.9 06/17/2024       Results from last 7 days   Lab Units 06/17/24  0526   SODIUM mmol/L 138   POTASSIUM mmol/L 3.6   CHLORIDE mmol/L 97*   CO2 mmol/L 30.0*   BUN mg/dL 11   CREATININE mg/dL 0.60   CALCIUM mg/dL 8.9   GLUCOSE mg/dL 105*       Results Review:      All brain images and reports were personally reviewed and I agree with the interpretations except as noted below.    MRI Brain Without Contrast 6/16/2024  Impression: 1. Acute/subacute infarct in the posterior left frontal lobe. 2. Minimal chronic small vessel ischemic change. 3. Small left mastoid effusion.       MRI Angiogram Venogram Head 6/16/2024  Impression: No evidence of dural venous sinus thrombosis.     CT Venogram head 6/16/2024  Impression: 0.4 cm aneurysm of the basilar artery tip. Otherwise no vessel stenosis or occlusion is appreciated.    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 Otherwise no vessel stenosis or occlusion is appreciated.    CT Head Without Contrast 6/16/2024  Impression: 1. No intracranial hemorrhage 2. No CT changes of acute vascular territory brain ischemia at this time.        Assessment/Plan     Assessment:    Aminah Tellez is a 73-year-old female former smoker PMH HTN, HLD, GERD, anxiety, depression who presented to [facility] for acute onset dysarthria and right hand weakness.  CT head 6/16/2024 without hemorrhage.  CT angiogram head and neck 6/16/2024 revealed 0.4 cm basilar tip aneurysm.  TNK was deferred by [facility] due to aneurysm.  CT angiogram head and neck at outside hospital mentioned possible left transverse and sigmoid sinus  thromboses.  Patient was started on heparin drip and nicardipine started for SBP <180 while on heparin drip.  Patient was transferred to [facility] for higher level of care.  We repeated CT angiogram head and neck, CT venogram, and MR venogram which did not reveal sinus thromboses thus heparin drip and nicardipine were discontinued to allow for permissive hypertension. MRI brain 6/16/2024 revealed acute left frontal lobe infarct.    # Acute left frontal lobe infarct suspect cardioembolic.  No known history of atrial fibrillation    -Continue aspirin 81 mg daily  -Continue clopidogrel 75 mg daily  -Continue atorvastatin 80 mg daily  -Transthoracic echocardiogram pending  -Monitor telemetry for atrial fibrillation  -Recommend Holter monitor placement prior to discharge  -EEG ordered due to observed left facial twitching  -Repeat MRI ordered due to suspected stroke expansion secondary to hypoperfusion injury versus collateral failure  -PT OT pending  -Neuro-checks per unit protocol while inpatient  -Blood pressure goal: permissive hypertension SBP < 220/110  -DVT prophylaxis: SCD, enoxaparin    # 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  Please call with questions.     Clau Hinojsoa MD  [facility] STROKE NEURO  06/17/24  08:43 EDT

## 2024-06-17 NOTE — THERAPY EVALUATION
Acute Care - Speech Language Pathology   Swallow Initial Evaluation [facility]  Clinical Swallow Evaluation  Cognitive-Communication Evaluation     Patient Name: Aminah Calderon  : 1950  MRN: [id number]  Today's Date: 2024               Admit Date: 2024    Visit Dx:     ICD-10-CM ICD-9-CM   1. Oral phase dysphagia  R13.11 787.21   2. Aphasia  R47.01 784.3     Patient Active Problem List   Diagnosis    Acute CVA (cerebrovascular accident)    Essential hypertension    Basilar artery aneurysm    Stroke     History reviewed. No pertinent past medical history.  History reviewed. No pertinent surgical history.    SLP Recommendation and Plan  SLP Swallowing Diagnosis: mild-moderate, oral dysphagia (24)  SLP Diet Recommendation: soft to chew textures, chopped, thin liquids, other (see comments) (can adjust solids up/down to pt's preference. Starting chopped for balance of ease) (24)  Recommended Precautions and Strategies: upright posture during/after eating, small bites of food and sips of liquid, general aspiration precautions (24)  SLP Rec. for Method of Medication Administration: meds whole, with puree, as tolerated (24)     Monitor for Signs of Aspiration: yes, notify SLP if any concerns (24)     Swallow Criteria for Skilled Therapeutic Interventions Met: demonstrates skilled criteria (24)  Anticipated Discharge Disposition (SLP): inpatient rehabilitation facility (24)  Rehab Potential/Prognosis, Swallowing: good, to achieve stated therapy goals (24)  Therapy Frequency (Swallow): 5 days per week (24)  Predicted Duration Therapy Intervention (Days): 1 week (24)  Oral Care Recommendations: Oral Care BID/PRN, Toothbrush (24)                                        Plan of Care Reviewed With: patient, family      SWALLOW EVALUATION (Last 72 Hours)       SLP Adult Swallow Evaluation        Row Name 06/17/24 0830                   Rehab Evaluation    Document Type evaluation  -SM        Subjective Information no complaints  -SM        Patient Observations alert;cooperative  -SM        Patient Effort good  -SM           General Information    Patient Profile Reviewed yes  -SM        Pertinent History Of Current Problem Adm R weakness, aphasia, L frontal CVA  -        Current Method of Nutrition NPO  -SM        Prior Level of Function-Communication WFL  -        Prior Level of Function-Swallowing safe, efficient swallowing in all situations  -        Plans/Goals Discussed with patient and family;agreed upon  -        Barriers to Rehab none identified  -        Patient's Goals for Discharge return to PO diet  -        Family Goals for Discharge patient able to return to PO diet  -SM           Pain    Additional Documentation Pain Scale: Numbers Pre/Post-Treatment (Group)  -SM           Pain Scale: Numbers Pre/Post-Treatment    Pretreatment Pain Rating 0/10 - no pain  -SM        Posttreatment Pain Rating 0/10 - no pain  -SM           Oral Musculature and Cranial Nerve Assessment    Oral Labial or Buccal Impairment, Detail, Cranial Nerve VII (Facial): right labial droop  -SM        Lingual Impairment, Detail. Cranial Nerves IX, XII (Glossopharyngeal and Hypoglossal) reduced strength right  -SM           General Eating/Swallowing Observations    Respiratory Support Currently in Use nasal cannula  -SM           Clinical Swallow Eval    Oral Prep Phase impaired  -SM        Oral Transit impaired  -SM        Pharyngeal Phase no overt signs/symptoms of pharyngeal impairment  -SM           Oral Prep Concerns    Oral Prep Concerns prolonged mastication;increased prep time;anterior loss  -SM        Prolonged Mastication regular consistencies  -SM        Anterior Loss thin  -SM        Increased Prep Time regular consistencies  -SM           Oral Transit Concerns    Oral Transit Concerns increased oral  transit time  -        Increased Oral Transit Time pudding;regular consistencies  -        Oral Transit Concerns, Comment No overt s/s aspiration though given oral dysphagia, does need strict use of aspiration precautions.  -           SLP Evaluation Clinical Impression    SLP Swallowing Diagnosis mild-moderate;oral dysphagia  -        Functional Impact risk of aspiration/pneumonia  -        Rehab Potential/Prognosis, Swallowing good, to achieve stated therapy goals  -        Swallow Criteria for Skilled Therapeutic Interventions Met demonstrates skilled criteria  -           Recommendations    Therapy Frequency (Swallow) 5 days per week  -        Predicted Duration Therapy Intervention (Days) 1 week  -        SLP Diet Recommendation soft to chew textures;chopped;thin liquids;other (see comments)  can adjust solids up/down to pt's preference. Starting chopped for balance of ease  -        Recommended Precautions and Strategies upright posture during/after eating;small bites of food and sips of liquid;general aspiration precautions  -        Oral Care Recommendations Oral Care BID/PRN;Toothbrush  -        SLP Rec. for Method of Medication Administration meds whole;with puree;as tolerated  -        Monitor for Signs of Aspiration yes;notify SLP if any concerns  -        Anticipated Discharge Disposition (SLP) inpatient rehabilitation facility  -                  User Key  (r) = Recorded By, (t) = Taken By, (c) = Cosigned By      Initials Name Effective Dates     Noemí Reyes MS CCC-SLP 02/03/23 -                     EDUCATION  The patient has been educated in the following areas:   Dysphagia (Swallowing Impairment) Modified Diet Instruction.        SLP GOALS       Row Name 06/17/24 0830             (LTG) Patient will demonstrate functional swallow for    Diet Texture (Demonstrate functional swallow) soft to chew (whole) textures  -      Liquid viscosity (Demonstrate  functional swallow) thin liquids  -SM      [place] (Demonstrate functional swallow) independently (over 90% accuracy)  -SM      Time Frame (Demonstrate functional swallow) 1 week  -SM         (STG) Patient will tolerate trials of    Consistencies Trialed (Tolerate trials) soft to chew (chopped) textures;thin liquids  -SM      Desired Outcome (Tolerate trials) with adequate oral prep/transit/clearance;without signs/symptoms of aspiration  -SM      [place] (Tolerate trials) independently (over 90% accuracy)  -SM      Time Frame (Tolerate trials) 1 week  -SM         (STG) Patient will tolerate therapeutic trials of    Consistencies Trialed (Tolerate therapeutic trials) soft to chew (whole) textures  -SM      Desired Outcome (Tolerate therapeutic trials) with adequate oral prep/transit/clearance  -SM      [place] (Tolerate therapeutic trials) independently (over 90% accuracy)  -SM      Time Frame (Tolerate therapeutic trials) 1 week  -SM         (STG) Labial Strengthening Goal 1 (SLP)    Activity (Labial Strengthening Goal 1, SLP) increase labial tone  -SM      Increase Labial Tone labial resistance exercises;swallow trials  -SM      [place]/Accuracy (Labial Strengthening Goal 1, SLP) independently (over 90% accuracy)  -SM      Time Frame (Labial Strengthening Goal 1, SLP) 1 week  -SM         (STG) Lingual Strengthening Goal 1 (SLP)    Activity (Lingual Strengthening Goal 1, SLP) increase lingual tone/sensation/control/coordination/movement  -SM      Increase Lingual Tone/Sensation/Control/Coordination/Movement swallow trials;lingual resistance exercises  -SM      [place]/Accuracy (Lingual Strengthening Goal 1, SLP) independently (over 90% accuracy)  -SM      Time Frame (Lingual Strengthening Goal 1, SLP) 1 week  -SM         (STG) Swallow Management Recall Goal 1 (SLP)    Activity (Swallow Management Recall Goal 1, SLP) aspiration precautions;safe diet/liquid level  -SM       [place]/Accuracy (Swallow Management Recall Goal 1, SLP) independently (over 90% accuracy)  -      Time Frame (Swallow Management Recall Goal 1, SLP) 1 week  -         Patient will demonstrate functional cognitive-linguistic skills for return to discharge environment    [place] with use of compensatory strategies  -      Time frame 1 week  -         SLP Diagnostic Treatment     Patient will participate in further assessment in the following areas reading comprehension;graphic expression;motor speech;cognitive-linguistic  -      Time Frame (Diagnostic) 1 week  -         Word Retrieval Skills Goal 1 (SLP)    Improve Word Retrieval Skills By Goal 1 (SLP) completing automatic speech task, counting;simple;repeating sounds;repeating words;completing open ended structured sentence;answer WH question with one word;70%;with moderate cues (50-74%)  -      Time Frame (Word Retrieval Goal 1, SLP) 1 week  -                User Key  (r) = Recorded By, (t) = Taken By, (c) = Cosigned By      Initials Name Provider Type    Noemí Brandon MS CCC-SLP Speech and Language Pathologist                         Time Calculation:    Time Calculation- SLP       Row Name 06/17/24 1047             Time Calculation- SLP    SLP Start Time 0830  -      SLP Received On 06/17/24  -         Untimed Charges    57175-AR Eval Speech and Production w/ Language Minutes 23  -SM      76831-WW Eval Oral Pharyng Swallow Minutes 28  -SM         Total Minutes    Untimed Charges Total Minutes 51  -SM       Total Minutes 51  -SM                User Key  (r) = Recorded By, (t) = Taken By, (c) = Cosigned By      Initials Name Provider Type    Noemí Brandon MS CCC-SLP Speech and Language Pathologist                    Therapy Charges for Today       Code Description Service Date Service Provider Modifiers Qty    94191995593  ST EVAL ORAL PHARYNG SWALLOW 2 6/17/2024 Noemí Reyes MS CCC-SLP GN 1     [id number] \[facility]\"" SPEECH AND PROD W LANG  2 2024 Noemí Reyes, MS CCC-SLP GN 1                 Noemí Reyes MS CCC-SLP  2024   and Acute Care - Speech Language Pathology Initial Evaluation   [place]     Patient Name: Aminah Calderon  : 1950  MRN: [id number]  Today's Date: 2024               Admit Date: 2024     Visit Dx:    ICD-10-CM ICD-9-CM   1. Oral phase dysphagia  R13.11 787.21   2. Aphasia  R47.01 784.3     Patient Active Problem List   Diagnosis    Acute CVA (cerebrovascular accident)    Essential hypertension    Basilar artery aneurysm    Stroke     History reviewed. No pertinent past medical history.  History reviewed. No pertinent surgical history.    SLP Recommendation and Plan  SLP Diagnosis: severe, aphasia (24)  SLP Diagnosis Comments: Severe expressive aphasia. Simple auditory comprehension functional. Will continue higher level assessment at next session. (24)     Monitor for Signs of Aspiration: yes, notify SLP if any concerns (24)  Swallow Criteria for Skilled Therapeutic Interventions Met: demonstrates skilled criteria (24)  SLC Criteria for Skilled Therapy Interventions Met: yes (24)  Anticipated Discharge Disposition (SLP): inpatient rehabilitation facility (24)     Therapy Frequency (Swallow): 5 days per week (24)  Therapy Frequency (SLP SLC): 5 days per week (24)  Predicted Duration Therapy Intervention (Days): 1 week (24)  Oral Care Recommendations: Oral Care BID/PRN, Toothbrush (24)                          Plan of Care Reviewed With: patient, family (24 1047)      SLP EVALUATION (Last 72 Hours)       SLP SLC Evaluation       Row Name 24                   General Information    Prior Level of Function-Communication WFL  -           Comprehension Assessment/Intervention    Comprehension Assessment/Intervention  "Auditory Comprehension  -           Auditory Comprehension Assessment/Intervention    Answers Questions (Communication) yes/no;personal;simple;WFL  -        Able to Follow Commands (Communication) 1-step;WFL  -        Narrative Discourse conversational level;WFL;other (see comments)  at simple level  -           Expression Assessment/Intervention    Expression Assessment/Intervention verbal expression  -           Verbal Expression Assessment/Intervention    Automatic Speech (Communication) severe impairment  -        Repetition sounds;mild impairment;words;severe impairment  -        Spontaneous/Functional Words simple;severe impairment  -           Motor Speech Assessment/Intervention    Motor Speech Function unable/difficult to assess  -           Cursory Voice Assessment/Intervention    Quality and Resonance (Voice) WFL;other (see comment)  per limited assessment with \"ahh\" voicing only  -           Augmentative/Alternative Communication    Gestures (Alternative Communication) facial affect;head nod;head shake;spontaneously gestures wants/needs  -        Gestures, Comment functional. Paper at bedside, which pt has been utilizing. Holding on further cog-comm assessment until next session so pt can eat breakfast. Pt/family in agreement.  -           Cognitive Assessment Intervention- SLP    Cognitive Function (Cognition) unable/difficult to assess  -           SLP Evaluation Clinical Impressions    SLP Diagnosis severe;aphasia  -        SLP Diagnosis Comments Severe expressive aphasia. Simple auditory comprehension functional. Will continue higher level assessment at next session.  -        Rehab Potential/Prognosis good  -        SLC Criteria for Skilled Therapy Interventions Met yes  -        Functional Impact difficulty in expressing complex messages  -           Recommendations    Therapy Frequency (SLP SLC) 5 days per week  -                  User Key  (r) = Recorded By, " (t) = Taken By, (c) = Cosigned By      Initials Name Effective Dates    MACEY Reyes Noemí M, MS CCC-SLP 02/03/23 -                        EDUCATION  The patient has been educated in the following areas:     Cognitive Impairment Communication Impairment.           SLP GOALS       Row Name 06/17/24 0830             (LTG) Patient will demonstrate functional swallow for    Diet Texture (Demonstrate functional swallow) soft to chew (whole) textures  -SM      Liquid viscosity (Demonstrate functional swallow) thin liquids  -SM      [place] (Demonstrate functional swallow) independently (over 90% accuracy)  -SM      Time Frame (Demonstrate functional swallow) 1 week  -SM         (STG) Patient will tolerate trials of    Consistencies Trialed (Tolerate trials) soft to chew (chopped) textures;thin liquids  -SM      Desired Outcome (Tolerate trials) with adequate oral prep/transit/clearance;without signs/symptoms of aspiration  -SM      [place] (Tolerate trials) independently (over 90% accuracy)  -SM      Time Frame (Tolerate trials) 1 week  -SM         (STG) Patient will tolerate therapeutic trials of    Consistencies Trialed (Tolerate therapeutic trials) soft to chew (whole) textures  -SM      Desired Outcome (Tolerate therapeutic trials) with adequate oral prep/transit/clearance  -SM      [place] (Tolerate therapeutic trials) independently (over 90% accuracy)  -SM      Time Frame (Tolerate therapeutic trials) 1 week  -SM         (STG) Labial Strengthening Goal 1 (SLP)    Activity (Labial Strengthening Goal 1, SLP) increase labial tone  -SM      Increase Labial Tone labial resistance exercises;swallow trials  -SM      [place]/Accuracy (Labial Strengthening Goal 1, SLP) independently (over 90% accuracy)  -SM      Time Frame (Labial Strengthening Goal 1, SLP) 1 week  -SM         (STG) Lingual Strengthening Goal 1 (SLP)    Activity (Lingual Strengthening Goal 1, SLP) increase lingual  tone/sensation/control/coordination/movement  -SM      Increase Lingual Tone/Sensation/Control/Coordination/Movement swallow trials;lingual resistance exercises  -      [place]/Accuracy (Lingual Strengthening Goal 1, SLP) independently (over 90% accuracy)  -SM      Time Frame (Lingual Strengthening Goal 1, SLP) 1 week  -SM         (STG) Swallow Management Recall Goal 1 (SLP)    Activity (Swallow Management Recall Goal 1, SLP) aspiration precautions;safe diet/liquid level  -SM      [place]/Accuracy (Swallow Management Recall Goal 1, SLP) independently (over 90% accuracy)  -SM      Time Frame (Swallow Management Recall Goal 1, SLP) 1 week  -SM         Patient will demonstrate functional cognitive-linguistic skills for return to discharge environment    [place] with use of compensatory strategies  -SM      Time frame 1 week  -SM         SLP Diagnostic Treatment     Patient will participate in further assessment in the following areas reading comprehension;graphic expression;motor speech;cognitive-linguistic  -      Time Frame (Diagnostic) 1 week  -SM         Word Retrieval Skills Goal 1 (SLP)    Improve Word Retrieval Skills By Goal 1 (SLP) completing automatic speech task, counting;simple;repeating sounds;repeating words;completing open ended structured sentence;answer WH question with one word;70%;with moderate cues (50-74%)  -      Time Frame (Word Retrieval Goal 1, SLP) 1 week  -SM                User Key  (r) = Recorded By, (t) = Taken By, (c) = Cosigned By      Initials Name Provider Type    Noemí Brandon MS CCC-SLP Speech and Language Pathologist                              Time Calculation:      Time Calculation- SLP       Row Name 06/17/24 1047             Time Calculation- SLP    SLP Start Time 0830  -      SLP Received On 06/17/24  -         Untimed Charges    22495-ER Eval Speech and Production w/ Language Minutes 23  -      54147-AS Eval Oral Pharyng Swallow Minutes  28  -SM         Total Minutes    Untimed Charges Total Minutes 51  -SM       Total Minutes 51  -SM                User Key  (r) = Recorded By, (t) = Taken By, (c) = Cosigned By      Initials Name Provider Type    Noemí Brandon MS CCC-SLP Speech and Language Pathologist                    Therapy Charges for Today       Code Description Service Date Service Provider Modifiers Qty    03355001665 HC ST EVAL ORAL PHARYNG SWALLOW 2 6/17/2024 Noemí Reyes MS CCC-SLP GN 1    [id number] HC ST EVAL SPEECH AND PROD W LANG  2 6/17/2024 Noemí Reyes MS CCC-SLP GN 1                       MS ALEX [employer]  6/17/2024

## 2024-06-17 NOTE — PLAN OF CARE
Goal Outcome Evaluation:  Plan of Care Reviewed With: patient, family           Outcome Evaluation: PT eval complete. Pt presents with balance deficits, weakness, mild coordination deficits, and decreased activity tolerance leading to impairments in functional mobility below baseline. Pt amb 30'+30' w/ B UE support on tripod monitor; MiNAx1. Pt will benefit from skilled IP PT to address impairments and return to PLOF. PT rec IRF at d/c.      Anticipated Discharge Disposition (PT): inpatient rehabilitation facility

## 2024-06-18 LAB
D-LACTATE SERPL-SCNC: 1.6 MMOL/L (ref 0.5–2)
D-LACTATE SERPL-SCNC: 2.3 MMOL/L (ref 0.5–2)
MAGNESIUM SERPL-MCNC: 2.3 MG/DL (ref 1.6–2.4)
PROCALCITONIN SERPL-MCNC: 0.05 NG/ML (ref 0–0.25)

## 2024-06-18 PROCEDURE — 99232 SBSQ HOSP IP/OBS MODERATE 35: CPT | Performed by: INTERNAL MEDICINE

## 2024-06-18 PROCEDURE — 83605 ASSAY OF LACTIC ACID: CPT | Performed by: INTERNAL MEDICINE

## 2024-06-18 PROCEDURE — 87040 BLOOD CULTURE FOR BACTERIA: CPT | Performed by: INTERNAL MEDICINE

## 2024-06-18 PROCEDURE — 84145 PROCALCITONIN (PCT): CPT | Performed by: INTERNAL MEDICINE

## 2024-06-18 PROCEDURE — 83735 ASSAY OF MAGNESIUM: CPT | Performed by: INTERNAL MEDICINE

## 2024-06-18 PROCEDURE — 99233 SBSQ HOSP IP/OBS HIGH 50: CPT | Performed by: NURSE PRACTITIONER

## 2024-06-18 PROCEDURE — 25010000002 ENOXAPARIN PER 10 MG

## 2024-06-18 RX ORDER — ACETAMINOPHEN 325 MG/1
650 TABLET ORAL EVERY 6 HOURS PRN
Status: DISCONTINUED | OUTPATIENT
Start: 2024-06-18 | End: 2024-06-20 | Stop reason: HOSPADM

## 2024-06-18 RX ORDER — FLUTICASONE PROPIONATE 50 MCG
2 SPRAY, SUSPENSION (ML) NASAL DAILY
Status: DISCONTINUED | OUTPATIENT
Start: 2024-06-18 | End: 2024-06-20 | Stop reason: HOSPADM

## 2024-06-18 RX ORDER — AMOXICILLIN AND CLAVULANATE POTASSIUM 875; 125 MG/1; MG/1
1 TABLET, FILM COATED ORAL EVERY 12 HOURS SCHEDULED
Status: DISCONTINUED | OUTPATIENT
Start: 2024-06-18 | End: 2024-06-18

## 2024-06-18 RX ADMIN — ASPIRIN 81 MG: 81 TABLET, CHEWABLE ORAL at 08:48

## 2024-06-18 RX ADMIN — SENNOSIDES AND DOCUSATE SODIUM 2 TABLET: 50; 8.6 TABLET ORAL at 20:26

## 2024-06-18 RX ADMIN — ENOXAPARIN SODIUM 40 MG: 100 INJECTION SUBCUTANEOUS at 08:48

## 2024-06-18 RX ADMIN — SENNOSIDES AND DOCUSATE SODIUM 2 TABLET: 50; 8.6 TABLET ORAL at 08:48

## 2024-06-18 RX ADMIN — CLOPIDOGREL BISULFATE 75 MG: 75 TABLET ORAL at 08:48

## 2024-06-18 RX ADMIN — Medication 10 ML: at 08:49

## 2024-06-18 RX ADMIN — ACETAMINOPHEN 650 MG: 325 TABLET ORAL at 06:30

## 2024-06-18 RX ADMIN — FLUTICASONE PROPIONATE 2 SPRAY: 50 SPRAY, METERED NASAL at 14:55

## 2024-06-18 RX ADMIN — ACETAMINOPHEN 650 MG: 325 TABLET ORAL at 19:45

## 2024-06-18 RX ADMIN — Medication 10 ML: at 20:27

## 2024-06-18 RX ADMIN — PANTOPRAZOLE SODIUM 40 MG: 40 TABLET, DELAYED RELEASE ORAL at 05:48

## 2024-06-18 NOTE — PROGRESS NOTES
Pulmonary/Critical Care Follow-up     LOS: 1 day   Patient Care Team:  Yung Marie MD as PCP - General (Internal Medicine)    Chief Complaint: Strokelike symptoms      Subjective     History reviewed and updated in EMR as indicated.    Interval History:     Patient is doing well.  She continues to have significant expressive aphasia but this is improving.  No fevers or chills.  No other complaints.  Ambulated in the hurtado without difficulty.     History taken from: Chart, staff.  History limited from patient due to expressive aphasia.    PMH/FH/Social History were reviewed and updated appropriately in the electronic medical record.     Review of Systems:    Review of 14 systems was completed with positives and pertinent negatives noted in the subjective section.  All other systems reviewed and are negative.   Exceptions are noted below:    Limited secondary to expressive aphasia.      Objective     Vital Signs  Temp:  [97.6 °F (36.4 °C)-98.5 °F (36.9 °C)] 97.6 °F (36.4 °C)  Heart Rate:  [59-86] 85  Resp:  [16-18] 16  BP: (121-180)/(59-92) 157/80  06/17 0701 - 06/18 0700  In: 533.5 [P.O.:400; I.V.:133.5]  Out: 400 [Urine:400]  Body mass index is 27.81 kg/m².     IV drips:  Pharmacy to Dose enoxaparin (LOVENOX)       Physical Exam:     Constitutional:   Alert, in no acute distress   Head:   Normocephalic, atraumatic   Eyes:           Lids and lashes normal, conjunctivae and sclerae normal.  PER   ENMT:  Ears appear intact with no abnormalities noted     Lips normal.     Neck:  Trachea midline, no JVD   Lungs/Resp:    Normal effort, symmetric chest rise, no crepitus, clear to      auscultation bilaterally.               Heart/CV:   Regular rhythm and normal rate, no murmur   Abdomen/GI:    Soft, nontender, nondistended   :    Deferred   Extremities/MSK:  No clubbing or cyanosis.  No edema.     Pulses:  Pulses palpable and equal bilaterally   Skin:  No bleeding, bruising or rash   Heme/Lymph:  No cervical or  supraclavicular adenopathy.   Neurologic:      Psychiatric:    Moves all extremities with no obvious focal motor deficit.  Mild right facial droop.  Significant expressive aphasia.    Non-agitated, normal affect.    The above physical exam findings were reviewed and reflect my exam findings as of today's exam.   Electronically signed by:  John Marroquin MD  06/18/24  11:13 EDT      Results Review:     I reviewed the patient's new clinical results.   Results from last 7 days   Lab Units 06/17/24  1830 06/17/24  0526   SODIUM mmol/L  --  138   POTASSIUM mmol/L 4.0 3.6   CHLORIDE mmol/L  --  97*   CO2 mmol/L  --  30.0*   BUN mg/dL  --  11   CREATININE mg/dL  --  0.60   CALCIUM mg/dL  --  8.9   GLUCOSE mg/dL  --  105*     Results from last 7 days   Lab Units 06/17/24  0526   WBC 10*3/mm3 11.85*   HEMOGLOBIN g/dL 12.5   HEMATOCRIT % 37.0   PLATELETS 10*3/mm3 291         Results from last 7 days   Lab Units 06/18/24  0018 06/17/24  0526   MAGNESIUM mg/dL 2.3 1.4*       I reviewed the patient's new imaging including images and reports.    MRI brain 6/17/2024:    Impression:  1.No significant progression in the area of infarction involving the territory of the left middle cerebral artery in the frontal area.  2.Minimal periventricular white matter changes which could reflect more chronic small vessel ischemic change.  3.There is some sphenoid sinus and left mastoid disease which has been noted    MRI brain 6/16/2024:  Impression:  1.Acute/subacute infarct in the posterior left frontal lobe.  2.Minimal chronic small vessel ischemic change.  3.Small left mastoid effusion.    MRV brain 6/16/2024:    IMPRESSION:  Impression:  No evidence of dural venous sinus thrombosis.    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 Otherwise no vessel stenosis or occlusion is appreciated.       Medication Review:   aspirin, 81 mg, Oral, Daily   Or  aspirin, 300 mg, Rectal, Daily  atorvastatin, 80 mg, Oral,  Nightly  clopidogrel, 75 mg, Oral, Daily  enoxaparin, 40 mg, Subcutaneous, Daily  pantoprazole, 40 mg, Oral, Q AM  senna-docusate sodium, 2 tablet, Oral, BID  sodium chloride, 10 mL, Intravenous, Q12H      Pharmacy to Dose enoxaparin (LOVENOX),         Assessment & Plan         Acute CVA (cerebrovascular accident)    Essential hypertension    Basilar artery aneurysm    Stroke    73 y.o. lifetime non-smoker with history of hypertension, hyperlipidemia, anxiety and occasional Xanax use, admitted to the ICU on 6/16/2024 after transfer from an outside facility for presumptive diagnosis of CVA.  Patient was normal on the morning of 6/16/2024 at 10 AM.  She took a nap and woke up at noon with right-sided weakness and slurred speech that improved transiently.  She went to her local hospital where initial NIH was 1.  She had hypertension treated with Cardene and subsequently had worsening NIH stroke scale to 10.  Cardene was discontinued.  There was some concern on outside imaging of possible thrombus in the left sigmoid sinus and partially in the left transverse sinus.  Patient was transferred to [facility] where an MRA/MRV and CT venogram showed a 4 mm basilar artery tip aneurysm but no evidence of venous sinus thrombosis.  MRI brain did show a left posterior frontal acute infarct.    Patient is doing better.  Speech improving no worsening.  Repeat MRI without progression or new finding.  Neurology plans for transesophageal echocardiogram tomorrow.    Given findings of sinusitis, I will order Flonase.  I am also going to check blood cultures today.  Could consider a course of Augmentin depending on symptoms.    Plan:  1.  For acute CVA: Continue aspirin/statin/Plavix.  Neurology following.  PT/OT/speech therapy.  Stroke order set.  Transthoracic echocardiogram tomorrow per neurology.  2.  For hypertension: Allowing permissive hypertension with goal BP less than 220/110 per neurology with plan to slowly normalize blood  pressure parameters.  3.  For basilar artery 0.4 cm aneurysm: Plan to follow-up as outpatient in neurology clinic.  4.  DVT prophylaxis: Lovenox.    Okay to telemetry when okay with neurology.    Electronically signed by:    John Marroquin MD  06/18/24  11:13 EDT      *. Please note that portions of this note were completed with [employer] - a voice recognition program.

## 2024-06-18 NOTE — PLAN OF CARE
Goal Outcome Evaluation:  Plan of Care Reviewed With: patient        Progress: no change  Outcome Evaluation: Pt remains in ICU, no neuro changes overnight, severe expressive aphasia continued. Vitals stable, tolerating room air and sitting up in the chair this morning. MRI completed.

## 2024-06-18 NOTE — PROGRESS NOTES
Stroke Neurology Progress Note     Subjective     This patient was seen in follow-up for: left MCA territory infarct  Present for the encounter were: self, patient, patient's son    Subjective:  NIH 6 this morning.   She is alert and following commands.   She has continued aphasia this morning, however speech is improving.    Objective      Temp:  [97.6 °F (36.4 °C)-98.5 °F (36.9 °C)] 98.2 °F (36.8 °C)  Heart Rate:  [59-86] 73  Resp:  [16-18] 16  BP: (121-180)/(59-92) 180/85    Objective    Physical Exam:  General Appearance: Alert  HEENT: anicteric sclera, no scleral injection  Lungs: respirations appear comfortable, no obvious increased work of breathing  Extremities: No cyanosis or fingernail clubbing   Skin: No rashes in exposed skin areas     Neurological Examination:   Mental status: Alert.  Follows commands.  Expressive aphasia.  Mute.   Cranial Nerves: Visual fields intact. Extraocular movements intact with no nystagmus. Midline gaze.  Right facial droop.  Sensory: Normal sensory exam to light touch.  Motor: Normal tone. Absent pronator drift.  Strength:  LUE: 5/5 biceps, triceps,   LLE: 5/5 hip flexion/extension  RUE: 5/5 biceps, triceps,   RLE:  5/5 hip flexion/extension    Labs:    Lab Results   Component Value Date    HGBA1C 5.50 06/17/2024      Lab Results   Component Value Date    CHOL 185 06/17/2024    TRIG 211 (H) 06/17/2024    HDL 67 (H) 06/17/2024    LDL 83 06/17/2024       Lab Results   Component Value Date    WBC 11.85 (H) 06/17/2024    HGB 12.5 06/17/2024    HCT 37.0 06/17/2024    MCV 83.7 06/17/2024     06/17/2024     Lab Results   Component Value Date    GLUCOSE 105 (H) 06/17/2024    BUN 11 06/17/2024    CREATININE 0.60 06/17/2024    BCR 18.3 06/17/2024    CO2 30.0 (H) 06/17/2024    CALCIUM 8.9 06/17/2024       Results from last 7 days   Lab Units 06/17/24  1830 06/17/24  0526   SODIUM mmol/L  --  138   POTASSIUM mmol/L 4.0 3.6   CHLORIDE mmol/L  --  97*   CO2 mmol/L  --  30.0*    BUN mg/dL  --  11   CREATININE mg/dL  --  0.60   CALCIUM mg/dL  --  8.9   GLUCOSE mg/dL  --  105*       Results Review:      All brain images and reports were personally reviewed and I agree with the interpretations except as noted below.      MRI Angiogram Venogram Head 6/16/2024  Impression: No evidence of dural venous sinus thrombosis.     MRI Brain Without Contrast    Result Date: 6/18/2024  MRI BRAIN WO CONTRAST Date of Exam: 6/17/2024 9:12 PM EDT Indication: concern for stroke expansion.  Comparison: MRI brain June 16, 2024 Technique:  Routine multiplanar/multisequence sequence images of the brain were obtained without contrast administration. Findings: The area of restricted diffusion in the left frontal lobe is similar to the prior MRI. There is a tiny punctate area of restricted diffusion in the left parietal area which is suggested on the prior exam as well. This looks more cortical. There are some minimal T2 signal changes in the periventricular areas which could reflect more chronic small vessel ischemic change. No acute intraorbital abnormality is appreciated. There is some minimal sphenoid sinus disease. There are also some left mastoid disease which has been suggested.     Impression: Impression: 1.No significant progression in the area of infarction involving the territory of the left middle cerebral artery in the frontal area. 2.Minimal periventricular white matter changes which could reflect more chronic small vessel ischemic change. 3.There is some sphenoid sinus and left mastoid disease which has been noted Electronically Signed: Bubba Villegas MD  6/18/2024 7:50 AM EDT  Workstation ID: [id number]    MRI Brain Without Contrast    Result Date: 6/16/2024  MRI BRAIN WO CONTRAST Date of Exam: 6/16/2024 10:21 PM EDT Indication: Stroke.  Comparison: CT head/angiography 6/16/2024. Technique:  Routine multiplanar/multisequence sequence images of the brain were obtained without contrast administration.  Findings: There is diffusion restriction in the posterior left frontal lobe laterally consistent with acute/subacute infarct. There is no evidence of acute or chronic intracranial hemorrhage. There is associated mild FLAIR hyperintense signal. There is otherwise minimal patchy periventricular FLAIR hyperintense signal abnormality in the brain. The basal ganglia, brainstem and cerebellum appear unremarkable. The major arterial flow voids appear intact. No mass effect, midline shift or abnormal extra-axial collection. There is thinning of the orbital lenses bilaterally. The paranasal sinuses and right mastoid air cells appear well aerated. There is a small left mastoid effusion. The midline structures appear intact. Calvarial and superficial soft tissue signal is within normal limits. 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     Impression: Impression: 1.Acute/subacute infarct in the posterior left frontal lobe. 2.Minimal chronic small vessel ischemic change. 3.Small left mastoid effusion. 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 Otherwise no vessel stenosis or occlusion is appreciated.    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 Otherwise no vessel stenosis or occlusion is appreciated.    CT Head Without Contrast 6/16/2024  Impression: 1. No intracranial hemorrhage 2. No CT changes of acute vascular territory brain ischemia at this time.    Results for orders placed during the hospital encounter of 06/16/24    Adult Transthoracic Echo Complete W/ Cont if Necessary Per Protocol (With Agitated Saline)    Interpretation Summary    Left ventricular systolic function is normal. Calculated left ventricular EF = 60.6%    Left ventricular diastolic function is consistent with grade III restrictive pattern without sequela of long term  elevated LAP (LA volume is normal)    Normal right ventricular cavity size, wall thickness and systolic function noted.    Normal left atrial size and volume noted. Saline test results are negative.    There is calcification of the aortic valve. The aortic valve appears trileaflet. No significant aortic valve regurgitation is present. No hemodynamically significant aortic valve stenosis is present.    There is calcification of the mitral valve. Mild mitral valve regurgitation is present. No significant mitral valve stenosis is present.    No significant tricuspid valve regurgitation or significant stenosis present. Insufficient TR velocity profile to estimate the right ventricular systolic pressure.    EEG 6/17/2024:  Impression-    Intermittent left frontal temporal slowing, No epileptiform activity is seen. Focal cerebral dysfunction impacting primarily the left anterior hemisphere.  This is consistent with a known left hemispheric stroke. No evidence for epilepsy is seen    Assessment/Plan     Assessment:    Aminah Tellez is a 73-year-old female former smoker PMH HTN, HLD, GERD, anxiety, depression who presented to [facility] for acute onset dysarthria and right hand weakness.  CT head 6/16/2024 without hemorrhage.  CT angiogram head and neck 6/16/2024 revealed 0.4 cm basilar tip aneurysm.  TNK was deferred by [facility] due to aneurysm.  CT angiogram head and neck at outside hospital mentioned possible left transverse and sigmoid sinus thromboses.  Patient was started on heparin drip and nicardipine started for SBP <180 while on heparin drip.  Patient was transferred to [facility] for higher level of care.  We repeated CT angiogram head and neck, CT venogram, and MR venogram which did not reveal sinus thromboses thus heparin drip and nicardipine were discontinued to allow for permissive hypertension. MRI brain 6/16/2024 revealed acute left frontal lobe infarct.    # Acute left frontal lobe infarct    suspect cardioembolic in etiology, but no known history of atrial fibrillation        -Continue DAPT for 30 days followed by ASA 325mg monotherapy   -Continue atorvastatin 80 mg daily  -TTE is unremarkable   -JAMARI for cardiac source, tentatively scheduled for tomorrow, assessing for central source  (JAMARI discussed with the patient and her son, agreeable)  -NPO after 0000, discussed with the nurse  -Monitor telemetry for atrial fibrillation, order STAT EKG for any rhythm changes   -Holter Monitor at discharge, ordered   -EEG: Negative for any seizure   -Repeat MRI shows no progression of stroke   -PT//OT recommend IPR   -Neuro-checks per unit protocol while inpatient  -Blood pressure goal: Slowly normalize, overall management per ICU medicine team   -DVT prophylaxis: SCD, enoxaparin    # Basilar artery aneurysm 0.4 cm  -Will monitor as outpatient stroke neurology clinic  -If grows, will refer to neurosurgery as outpatient      Stroke neurology will continue to follow. Plan discussed with ICU medicine. Please call with any questions or concerns.     CONTRERAS Roberto  [facility] [place] STROKE NEURO  06/18/24  07:37 EDT

## 2024-06-18 NOTE — PLAN OF CARE
Goal Outcome Evaluation: A&Ox4. TORRES's equally. Steady gait with ambulation. Able to verbalize in small sentences with severe dysarthria. No acute neurological changes. No c/o headache or nausea.        Problem: Adult Inpatient Plan of Care  Goal: Absence of Hospital-Acquired Illness or Injury  Intervention: Identify and Manage Fall Risk  Recent Flowsheet Documentation  Taken 6/18/2024 1800 by Tyrel Hatfield RN  Safety Promotion/Fall Prevention:   safety round/check completed   activity supervised  Taken 6/18/2024 1600 by Tyrel Hatfield RN  Safety Promotion/Fall Prevention:   safety round/check completed   activity supervised  Taken 6/18/2024 1400 by Tyrel Hatfield RN  Safety Promotion/Fall Prevention:   safety round/check completed   activity supervised  Taken 6/18/2024 1200 by Tyrel Hatfield RN  Safety Promotion/Fall Prevention:   safety round/check completed   activity supervised  Taken 6/18/2024 1000 by Tyrel Hatfield RN  Safety Promotion/Fall Prevention:   safety round/check completed   activity supervised  Taken 6/18/2024 0800 by Tyrel Hatfield RN  Safety Promotion/Fall Prevention:   safety round/check completed   activity supervised     Problem: Adult Inpatient Plan of Care  Goal: Absence of Hospital-Acquired Illness or Injury  Intervention: Prevent Skin Injury  Recent Flowsheet Documentation  Taken 6/18/2024 1800 by Tyrel Hatfield RN  Body Position: position changed independently  Taken 6/18/2024 1600 by Tyrel Hatfield RN  Body Position: position changed independently  Skin Protection:   tubing/devices free from skin contact   skin-to-skin areas padded   skin-to-device areas padded   incontinence pads utilized  Taken 6/18/2024 1400 by Tyrel Hatfield RN  Body Position: position changed independently  Taken 6/18/2024 1200 by Tyrel Hatfield RN  Body Position: position changed independently  Skin Protection:   tubing/devices free from skin contact   skin-to-skin areas padded   skin-to-device  areas padded   incontinence pads utilized  Taken 6/18/2024 1000 by Tyrel Hatfield RN  Body Position: position changed independently  Taken 6/18/2024 0800 by Tyrel Hatfield RN  Body Position: position changed independently  Skin Protection:   tubing/devices free from skin contact   incontinence pads utilized     Problem: Adult Inpatient Plan of Care  Goal: Absence of Hospital-Acquired Illness or Injury  Intervention: Prevent and Manage VTE (Venous Thromboembolism) Risk  Recent Flowsheet Documentation  Taken 6/18/2024 1800 by Tyrel Hatfield RN  Activity Management: up in chair  Taken 6/18/2024 1600 by Tyrel Hatfield RN  Activity Management:   up to bedside commode   back to bed  VTE Prevention/Management: sequential compression devices off  Range of Motion: active ROM (range of motion) encouraged  Taken 6/18/2024 1400 by Tyrel Hatfield RN  Activity Management: back to bed  Taken 6/18/2024 1200 by Tyrel Hatfield RN  Activity Management: up in chair  VTE Prevention/Management: sequential compression devices off  Range of Motion: active ROM (range of motion) encouraged  Taken 6/18/2024 1100 by Tyrel Hatfield RN  Activity Management: ambulated outside room  Taken 6/18/2024 1000 by Tyrel Hatfield RN  Activity Management: up in chair  Taken 6/18/2024 0800 by Tyrel Hatfield RN  Activity Management: up in chair  VTE Prevention/Management: sequential compression devices off  Range of Motion: active ROM (range of motion) encouraged     Problem: Adult Inpatient Plan of Care  Goal: Absence of Hospital-Acquired Illness or Injury  Intervention: Prevent Infection  Recent Flowsheet Documentation  Taken 6/18/2024 1800 by Tyrel Hatfield RN  Infection Prevention:   environmental surveillance performed   hand hygiene promoted  Taken 6/18/2024 1600 by Tyrel Hatfield RN  Infection Prevention:   environmental surveillance performed   hand hygiene promoted  Taken 6/18/2024 1400 by Tyrel Hatfield RN  Infection  Prevention:   environmental surveillance performed   hand hygiene promoted  Taken 6/18/2024 1200 by Tyrel Hatfield RN  Infection Prevention:   environmental surveillance performed   hand hygiene promoted  Taken 6/18/2024 1000 by Tyrel Hatfield RN  Infection Prevention:   environmental surveillance performed   hand hygiene promoted  Taken 6/18/2024 0800 by Tyrel Hatfield RN  Infection Prevention:   environmental surveillance performed   hand hygiene promoted     Problem: Skin Injury Risk Increased  Goal: Skin Health and Integrity  Intervention: Optimize Skin Protection  Recent Flowsheet Documentation  Taken 6/18/2024 1800 by Tyrel Hatfield RN  Head of Bed (HOB) Positioning: HOB at 60-90 degrees  Taken 6/18/2024 1600 by Tyrel Hatfield RN  Pressure Reduction Techniques: frequent weight shift encouraged  Head of Bed (HOB) Positioning: HOB at 30-45 degrees  Pressure Reduction Devices: pressure-redistributing mattress utilized  Skin Protection:   tubing/devices free from skin contact   skin-to-skin areas padded   skin-to-device areas padded   incontinence pads utilized  Taken 6/18/2024 1400 by Tyrel Hatfield RN  Head of Bed (HOB) Positioning: HOB at 30-45 degrees  Taken 6/18/2024 1200 by Tyrel Hatfield RN  Pressure Reduction Techniques:   frequent weight shift encouraged   weight shift assistance provided  Head of Bed (HOB) Positioning: HOB at 60-90 degrees  Pressure Reduction Devices: chair cushion utilized  Skin Protection:   tubing/devices free from skin contact   skin-to-skin areas padded   skin-to-device areas padded   incontinence pads utilized  Taken 6/18/2024 1000 by Tyrel Hatfield RN  Head of Bed (HOB) Positioning: HOB at 60-90 degrees  Taken 6/18/2024 0800 by Tyrel Hatfield RN  Pressure Reduction Techniques:   weight shift assistance provided   frequent weight shift encouraged  Head of Bed (HOB) Positioning: HOB at 45 degrees  Pressure Reduction Devices: chair cushion utilized  Skin  Protection:   tubing/devices free from skin contact   incontinence pads utilized     Problem: Cerebral Tissue Perfusion (Stroke, Ischemic/Transient Ischemic Attack)  Goal: Optimal Cerebral Tissue Perfusion  Intervention: Protect and Optimize Cerebral Perfusion  Recent Flowsheet Documentation  Taken 6/18/2024 1600 by Tyrel Hatfield RN  Cerebral Perfusion Promotion: blood pressure monitored  Taken 6/18/2024 1200 by Tyrel Hatfield RN  Cerebral Perfusion Promotion: blood pressure monitored  Taken 6/18/2024 0800 by Tyrel Hatfield RN  Cerebral Perfusion Promotion: blood pressure monitored     Problem: Communication Impairment (Stroke, Ischemic/Transient Ischemic Attack)  Goal: Improved Communication Skills  Intervention: Optimize Communication Skills  Recent Flowsheet Documentation  Taken 6/18/2024 1600 by Tyrel Hatfield RN  Communication Enhancement Strategies:   call light answered in person   communication board used   extra time allowed for response   one-step directions provided  Taken 6/18/2024 1200 by Tyrel Hatfield RN  Communication Enhancement Strategies:   call light answered in person   communication board used   extra time allowed for response   nonverbal strategies used   written communication utilized  Taken 6/18/2024 0800 by Tyrel Hatfield RN  Communication Enhancement Strategies:   extra time allowed for response   nonverbal strategies used   repetition utilized   call light answered in person   communication board used     Problem: Functional Ability Impaired (Stroke, Ischemic/Transient Ischemic Attack)  Goal: Optimal Functional Ability  Intervention: Optimize Functional Ability  Recent Flowsheet Documentation  Taken 6/18/2024 1800 by Tyrel Hatfield RN  Activity Management: up in chair  Taken 6/18/2024 1600 by Tyrel Hatfield RN  Activity Management:   up to bedside commode   back to bed  Taken 6/18/2024 1400 by Tyrel Hatfield RN  Activity Management: back to bed  Taken 6/18/2024 1200 by  Tyrel Hatfield, RN  Activity Management: up in chair  Taken 6/18/2024 1100 by Tyrel Hatfield, RN  Activity Management: ambulated outside room  Taken 6/18/2024 1000 by Tyrel Hatfield, RN  Activity Management: up in chair  Taken 6/18/2024 0800 by Tyrel Hatfield, RN  Activity Management: up in chair

## 2024-06-19 ENCOUNTER — APPOINTMENT (OUTPATIENT)
Dept: CARDIOLOGY | Facility: HOSPITAL | Age: 74
DRG: 042 | End: 2024-06-19
Payer: MEDICARE

## 2024-06-19 LAB — BH CV ECHO SHUNT ASSESSMENT PERFORMED (HIDDEN SCRIPTING): 1

## 2024-06-19 PROCEDURE — 93325 DOPPLER ECHO COLOR FLOW MAPG: CPT | Performed by: INTERNAL MEDICINE

## 2024-06-19 PROCEDURE — 97535 SELF CARE MNGMENT TRAINING: CPT

## 2024-06-19 PROCEDURE — 25010000002 ENOXAPARIN PER 10 MG

## 2024-06-19 PROCEDURE — 93312 ECHO TRANSESOPHAGEAL: CPT

## 2024-06-19 PROCEDURE — B24BZZ4 ULTRASONOGRAPHY OF HEART WITH AORTA, TRANSESOPHAGEAL: ICD-10-PCS | Performed by: INTERNAL MEDICINE

## 2024-06-19 PROCEDURE — 93320 DOPPLER ECHO COMPLETE: CPT | Performed by: INTERNAL MEDICINE

## 2024-06-19 PROCEDURE — 93320 DOPPLER ECHO COMPLETE: CPT

## 2024-06-19 PROCEDURE — 93325 DOPPLER ECHO COLOR FLOW MAPG: CPT

## 2024-06-19 PROCEDURE — 99232 SBSQ HOSP IP/OBS MODERATE 35: CPT | Performed by: NURSE PRACTITIONER

## 2024-06-19 PROCEDURE — 93312 ECHO TRANSESOPHAGEAL: CPT | Performed by: INTERNAL MEDICINE

## 2024-06-19 PROCEDURE — 99232 SBSQ HOSP IP/OBS MODERATE 35: CPT | Performed by: INTERNAL MEDICINE

## 2024-06-19 PROCEDURE — 97530 THERAPEUTIC ACTIVITIES: CPT

## 2024-06-19 PROCEDURE — 25010000002 MIDAZOLAM PER 1 MG: Performed by: INTERNAL MEDICINE

## 2024-06-19 PROCEDURE — 97110 THERAPEUTIC EXERCISES: CPT

## 2024-06-19 RX ORDER — METOPROLOL SUCCINATE 50 MG/1
50 TABLET, EXTENDED RELEASE ORAL
Status: DISCONTINUED | OUTPATIENT
Start: 2024-06-19 | End: 2024-06-20

## 2024-06-19 RX ORDER — MIDAZOLAM HYDROCHLORIDE 1 MG/ML
INJECTION INTRAMUSCULAR; INTRAVENOUS
Status: COMPLETED | OUTPATIENT
Start: 2024-06-19 | End: 2024-06-19

## 2024-06-19 RX ORDER — MIDAZOLAM HYDROCHLORIDE 1 MG/ML
6 INJECTION INTRAMUSCULAR; INTRAVENOUS
Status: COMPLETED | OUTPATIENT
Start: 2024-06-19 | End: 2024-06-19

## 2024-06-19 RX ADMIN — CLOPIDOGREL BISULFATE 75 MG: 75 TABLET ORAL at 08:49

## 2024-06-19 RX ADMIN — METHOHEXITAL SODIUM 20 MG: 500 INJECTION, POWDER, LYOPHILIZED, FOR SOLUTION INTRAMUSCULAR; INTRAVENOUS; RECTAL at 11:33

## 2024-06-19 RX ADMIN — Medication 10 ML: at 06:17

## 2024-06-19 RX ADMIN — ASPIRIN 81 MG: 81 TABLET, CHEWABLE ORAL at 08:49

## 2024-06-19 RX ADMIN — METOPROLOL SUCCINATE 50 MG: 50 TABLET, EXTENDED RELEASE ORAL at 16:09

## 2024-06-19 RX ADMIN — MIDAZOLAM HYDROCHLORIDE 1 MG: 1 INJECTION, SOLUTION INTRAMUSCULAR; INTRAVENOUS at 11:39

## 2024-06-19 RX ADMIN — Medication 10 ML: at 20:00

## 2024-06-19 RX ADMIN — MIDAZOLAM HYDROCHLORIDE 1 MG: 1 INJECTION, SOLUTION INTRAMUSCULAR; INTRAVENOUS at 11:33

## 2024-06-19 RX ADMIN — ENOXAPARIN SODIUM 40 MG: 100 INJECTION SUBCUTANEOUS at 08:49

## 2024-06-19 RX ADMIN — Medication 10 ML: at 08:49

## 2024-06-19 RX ADMIN — FLUTICASONE PROPIONATE 2 SPRAY: 50 SPRAY, METERED NASAL at 08:49

## 2024-06-19 NOTE — PROGRESS NOTES
Cardiology JAMARI note:      Left ventricular ejection fraction appears to be 56 - 60%.    Moderate mitral valve regurgitation is present.    No evidence of a left atrial appendage thrombus    Saline test results are negative.    Moderate mitral valve regurgitation is present.    The aortic valve exhibits sclerosis. Mild aortic valve regurgitation is present.    There is mild, (grade 2) plaque in the aortic arch present.      No cardiac source of embolus on this study.  Agree with continued rhythm monitoring to evaluate for atrial fibrillation

## 2024-06-19 NOTE — CASE MANAGEMENT/SOCIAL WORK
Continued Stay Note  [facility]     Patient Name: Aminah Calderon  MRN: [id number]  Today's Date: 6/19/2024    Admit Date: 6/16/2024    Plan: [facility] acute   Discharge Plan       Row Name 06/19/24 1149       Plan    Plan [facility] acute    Patient/Family in Agreement with Plan yes    Plan Comments Spoke with patient and son at bedside to discuss discharge disposition and therapy recommendations for IRF.  Patient agreeable and requests referral to [facility]; referral given to April.  Family may be able to transport to rehab.  CM will continue to follow and assist with discharge.                   Discharge Codes    No documentation.                       Jessica Montgomery RN

## 2024-06-19 NOTE — THERAPY TREATMENT NOTE
Patient Name: Aminah Calderon  : 1950    MRN: [id number]                              Today's Date: 2024       Admit Date: 2024    Visit Dx:     ICD-10-CM ICD-9-CM   1. Oral phase dysphagia  R13.11 787.21   2. Aphasia  R47.01 784.3     Patient Active Problem List   Diagnosis    Acute CVA (cerebrovascular accident)    Essential hypertension    Basilar artery aneurysm    Stroke     History reviewed. No pertinent past medical history.  History reviewed. No pertinent surgical history.   General Information       Row Name 24 1155          OT Time and Intention    Document Type therapy note (daily note)  -     Mode of Treatment occupational therapy  -       Row Name 24 1155          General Information    Patient Profile Reviewed yes  -     Existing Precautions/Restrictions fall  aphasia  -     Barriers to Rehab medically complex  -[facility] Name 24 1155          Cognition    Orientation Status (Cognition) oriented to;person;place;time  -KL       Row Name 24 1155          Safety Issues, Functional Mobility    Safety Issues Affecting Function (Mobility) insight into deficits/self-awareness  -     Impairments Affecting Function (Mobility) balance;coordination;endurance/activity tolerance;strength  -               User Key  (r) = Recorded By, (t) = Taken By, (c) = Cosigned By      Initials Name Provider Type    Janny Pena OT Occupational Therapist                     Mobility/ADL's       [facility] Name 24 1155          Bed Mobility    Bed Mobility sit-supine  -     Sit-Supine [place] (Bed Mobility) 1 person assist;contact guard;verbal cues  -     Assistive Device (Bed Mobility) bed rails;head of bed elevated  -KL       Row Name 24 1155          Transfers    Transfers sit-stand transfer;stand-sit transfer;toilet transfer  -[facility] Name 24 1155          Sit-Stand Transfer    Sit-Stand Daniels (Transfers) verbal cues;contact guard;1 person  assist  -     Assistive Device (Sit-Stand Transfers) other (see comments)  UE support  -       Row Name 06/19/24 1155          Stand-Sit Transfer    Stand-Sit [facility] [facility] (Transfers) contact guard;verbal cues;1 person assist  -     Assistive Device (Stand-Sit Transfers) other (see comments)  UE support  -       Row Name 06/19/24 1155          Toilet Transfer    Type (Toilet Transfer) sit-stand;stand-sit  -     [facility] [facility] Level (Toilet Transfer) contact guard;1 person assist  -     Assistive Device (Toilet Transfer) other (see comments)  UE support  -       Row Name 06/19/24 1155          Functional Mobility    Functional Mobility- Ind. Level contact guard assist  Advanced to SPV  -     Functional Mobility- Device other (see comments)  UE support  -     Functional Mobility-Distance (Feet) --  HH distances  -       Row Name 06/19/24 1155          Activities of Daily Living    BADL Assessment/Intervention lower body dressing;toileting;grooming  -KL       Row Name 06/19/24 1155          Lower Body Dressing Assessment/Training    [facility] [facility] Level (Lower Body Dressing) don;socks;contact guard assist  -     Position (Lower Body Dressing) supported sitting  -[facility] Name 06/19/24 1155          Toileting Assessment/Training    [place] Level (Toileting) perform perineal hygiene;adjust/manage clothing;maximum assist (25% patient effort)  -     Position (Toileting) supported standing  -KL       Row Name 06/19/24 1155          Grooming Assessment/Training    [place] Level (Grooming) hair care, combing/brushing;oral care regimen;wash face, hands;contact guard assist  -     Oral Care teeth brushed - regular toothbrush;mouth wash rinse  -     Position (Grooming) sink side;supported standing  -               User Key  (r) = Recorded By, (t) = Taken By, (c) = Cosigned By      Initials Name Provider Type    Janny Pena OT Occupational Therapist                   Obj/Interventions        Row Name 06/19/24 1158          Hand (Therapeutic Exercise)    Hand (Therapeutic Exercise) strengthening exercise  -     Hand Strengthening (Therapeutic Exercise) right;finger flexion;finger extension; strengthening;thumb flexion;thumb palmar aBduction;thumb pinch strengthening;thumb opposition;thumb extension;finger aBduction;finger aDduction;therapy putty;green;other (see comments);10 repetitions  red resistance cube  -       Row Name 06/19/24 1158          Motor Skills    Therapeutic Exercise hand  -       Row Name 06/19/24 1158          Balance    Balance Assessment sitting static balance;sitting dynamic balance;sit to stand dynamic balance;standing static balance;standing dynamic balance  -     Static Sitting Balance supervision  -     Dynamic Sitting Balance contact guard  -     Position, Sitting Balance supported;sitting in chair  -     Static Standing Balance supervision  -     Dynamic Standing Balance contact guard  -     Position/Device Used, Standing Balance supported  -     Balance Interventions sitting;standing;sit to stand;supported;static;dynamic;occupation based/functional task  -               User Key  (r) = Recorded By, (t) = Taken By, (c) = Cosigned By      Initials Name Provider Type    Janny Pena OT Occupational Therapist                   Goals/Plan    No documentation.                  Clinical Impression       Row Name 06/19/24 1159          Pain Assessment    Pretreatment Pain Rating 0/10 - no pain  -     Posttreatment Pain Rating 0/10 - no pain  -       Row Name 06/19/24 1159          Plan of Care Review    Plan of Care Reviewed With patient;son  -     Progress improving  -     Outcome Evaluation Pt demo improvement in speech, RUE dexterity and self-care in comparison to previous session. pt continues to benefit from IPOT services to address deficits in order to progress towards PLOF. d/c rec continues to be IPR at this time, will monitor  closely.  -       Row Name 06/19/24 1159          Therapy Plan Review/Discharge Plan (OT)    Anticipated Discharge Disposition (OT) inpatient rehabilitation facility  -       Row Name 06/19/24 1159          Vital Signs    Pre Systolic BP Rehab 160  -KL     Pre Treatment Diastolic BP 74  -KL     Pretreatment Heart Rate (beats/min) 70  -KL     Pre SpO2 (%) 96  -KL     O2 Delivery Pre Treatment room air  -KL     Pre Patient Position Sitting  -KL     Intra Patient Position Standing  -KL     Post Patient Position Supine  -       Row Name 06/19/24 1159          Positioning and Restraints    Pre-Treatment Position sitting in chair/recliner  -KL     Post Treatment Position bed  -KL     In Bed supine;exit alarm on;with family/caregiver;with nsg;call light within reach;encouraged to call for assist  -KL               User Key  (r) = Recorded By, (t) = Taken By, (c) = Cosigned By      Initials Name Provider Type    Janny Pena, CORNEL Occupational Therapist                   Outcome Measures       Row Name 06/19/24 1202          How much help from another is currently needed...    Putting on and taking off regular lower body clothing? 3  -KL     Bathing (including washing, rinsing, and drying) 3  -KL     Toileting (which includes using toilet bed pan or urinal) 2  -KL     Putting on and taking off regular upper body clothing 3  -KL     Taking care of personal grooming (such as brushing teeth) 4  -KL     Eating meals 4  -KL     AM-PAC 6 Clicks Score (OT) 19  -KL       Row Name 06/19/24 0800          How much help from another person do you currently need...    Turning from your back to your side while in flat bed without using bedrails? 4  -JM     Moving from lying on back to sitting on the side of a flat bed without bedrails? 4  -JM     Moving to and from a bed to a chair (including a wheelchair)? 4  -JM     Standing up from a chair using your arms (e.g., wheelchair, bedside chair)? 4  -JM     Climbing 3-5 steps with a  railing? 3  -     To walk in hospital room? 4  -     AM-PAC 6 Clicks Score (PT) 23  -     Highest Level of Mobility Goal 7 --> Walk 25 feet or more  -       Row Name 06/19/24 1202          Functional Assessment    Outcome Measure Options AM-PAC 6 Clicks Daily Activity (OT)  -               User Key  (r) = Recorded By, (t) = Taken By, (c) = Cosigned By      Initials Name Provider Type    Terra Ortega, RN Registered Nurse    Janny Pena OT Occupational Therapist                    Occupational Therapy Education       Title: PT OT SLP Therapies (In Progress)       Topic: Occupational Therapy (Done)       Point: ADL training (Done)       Description:   Instruct learner(s) on proper safety adaptation and remediation techniques during self care or transfers.   Instruct in proper use of assistive devices.                  Learning Progress Summary             Patient Acceptance, E,TB,D, VU,NR by MICKEY at 6/19/2024 1202    Acceptance, E, NR by MICKEY at 6/17/2024 1548   Family Acceptance, E,TB,D, VU,NR by MICKEY at 6/19/2024 1202    Acceptance, E, NR by MICKEY at 6/17/2024 1548                         Point: Home exercise program (Done)       Description:   Instruct learner(s) on appropriate technique for monitoring, assisting and/or progressing therapeutic exercises/activities.                  Learning Progress Summary             Patient Acceptance, E,TB,D, VU,NR by MICKEY at 6/19/2024 1202   Family Acceptance, E,TB,D, VU,NR by MICKEY at 6/19/2024 1202                         Point: Precautions (Done)       Description:   Instruct learner(s) on prescribed precautions during self-care and functional transfers.                  Learning Progress Summary             Patient Acceptance, E,TB,D, VU,NR by MICKEY at 6/19/2024 1202    Acceptance, E, NR by MICKEY at 6/17/2024 1548   Family Acceptance, E,TB,D, VU,NR by MICKEY at 6/19/2024 1202    Acceptance, E, NR by MICKEY at 6/17/2024 1548                         Point: Body mechanics (Done)        Description:   Instruct learner(s) on proper positioning and spine alignment during self-care, functional mobility activities and/or exercises.                  Learning Progress Summary             Patient Acceptance, E,TB,D, VU,NR by  at 6/19/2024 1202    Acceptance, E, NR by  at 6/17/2024 1548   Family Acceptance, E,TB,D, VU,NR by  at 6/19/2024 1202    Acceptance, E, NR by  at 6/17/2024 1548                                         User Key       Initials Effective Dates Name Provider Type Discipline     02/05/24 -  Janny Moss OT Occupational Therapist OT                  OT Recommendation and Plan  Planned Therapy Interventions (OT): activity tolerance training, adaptive equipment training, functional balance retraining, occupation/activity based interventions, neuromuscular control/coordination retraining, patient/caregiver education/training, ROM/therapeutic exercise, strengthening exercise, transfer/mobility retraining  Therapy Frequency (OT): daily  Plan of Care Review  Plan of Care Reviewed With: patient, son  Progress: improving  Outcome Evaluation: Pt demo improvement in speech, RUE dexterity and self-care in comparison to previous session. pt continues to benefit from IPOT services to address deficits in order to progress towards PLOF. d/c rec continues to be IPR at this time, will monitor closely.     Time Calculation:   Evaluation Complexity (OT)  Review Occupational Profile/Medical/Therapy History Complexity: expanded/moderate complexity  Assessment, Occupational Performance/Identification of Deficit Complexity: 3-5 performance deficits  Clinical Decision Making Complexity (OT): detailed assessment/moderate complexity  Overall Complexity of Evaluation (OT): moderate complexity     Time Calculation- OT       Row Name 06/19/24 1203             Time Calculation- OT    OT Start Time 0935  -      OT Received On 06/19/24  -         Timed Charges    48462 - OT Therapeutic Exercise Minutes  10  -      36041 - OT Therapeutic Activity Minutes 15  -KL      75946 - OT Self Care/Mgmt Minutes 25  -KL         Total Minutes    Timed Charges Total Minutes 50  -KL       Total Minutes 50  -KL                User Key  (r) = Recorded By, (t) = Taken By, (c) = Cosigned By      Initials Name Provider Type    Janny Pena OT Occupational Therapist                  Therapy Charges for Today       Code Description Service Date Service Provider Modifiers Qty    00593030886 HC OT THER PROC EA 15 MIN 6/19/2024 Janny Moss OT GO 1    07199127315 HC OT THERAPEUTIC ACT EA 15 MIN 6/19/2024 Janny Moss OT GO 1    35563307106 HC OT SELF CARE/MGMT/TRAIN EA 15 MIN 6/19/2024 Janny Moss OT GO 1                 Janny Moss OT  6/19/2024

## 2024-06-19 NOTE — PROGRESS NOTES
Stroke Neurology Progress Note     Subjective     This patient was seen in follow-up for: left MCA territory infarct  Present for the encounter were: self, patient, patient's son    Subjective:  Sitting up in the chair.  Son at bedside.  Working with OT.  Speech improving.  Continues with R FD.  Plan for JAMARI today.    Objective      Temp:  [97.2 °F (36.2 °C)-97.9 °F (36.6 °C)] 97.9 °F (36.6 °C)  Heart Rate:  [62-97] 62  Resp:  [16-18] 16  BP: (123-172)/(63-87) 123/67    Objective    Physical Exam  Constitutional:       General: She is not in acute distress.  HENT:      Head: Normocephalic and atraumatic.   Eyes:      Extraocular Movements: Extraocular movements intact.      Pupils: Pupils are equal, round, and reactive to light.   Cardiovascular:      Rate and Rhythm: Normal rate and regular rhythm.   Pulmonary:      Effort: Pulmonary effort is normal. No respiratory distress.   Abdominal:      General: There is no distension.      Palpations: Abdomen is soft.   Musculoskeletal:      Cervical back: Normal range of motion and neck supple.      Right lower leg: No edema.      Left lower leg: No edema.   Skin:     General: Skin is warm and dry.      Capillary Refill: Capillary refill takes less than 2 seconds.   Neurological:      Mental Status: She is alert and oriented to person, place, and time.      Cranial Nerves: Cranial nerve deficit present.      Comments: R FD, moderate to severe dysarthria and expressive aphasia, strength 5 out of 5 in all extremities, sensation intact         Labs:    Lab Results   Component Value Date    HGBA1C 5.50 06/17/2024      Lab Results   Component Value Date    CHOL 185 06/17/2024    TRIG 211 (H) 06/17/2024    HDL 67 (H) 06/17/2024    LDL 83 06/17/2024       Lab Results   Component Value Date    WBC 11.85 (H) 06/17/2024    HGB 12.5 06/17/2024    HCT 37.0 06/17/2024    MCV 83.7 06/17/2024     06/17/2024     Lab Results   Component Value Date    GLUCOSE 105 (H) 06/17/2024     BUN 11 06/17/2024    CREATININE 0.60 06/17/2024    BCR 18.3 06/17/2024    CO2 30.0 (H) 06/17/2024    CALCIUM 8.9 06/17/2024       Results from last 7 days   Lab Units 06/17/24  1830 06/17/24  0526   SODIUM mmol/L  --  138   POTASSIUM mmol/L 4.0 3.6   CHLORIDE mmol/L  --  97*   CO2 mmol/L  --  30.0*   BUN mg/dL  --  11   CREATININE mg/dL  --  0.60   CALCIUM mg/dL  --  8.9   GLUCOSE mg/dL  --  105*       Results Review:      All brain images and reports were personally reviewed and I agree with the interpretations except as noted below.      MRI Angiogram Venogram Head 6/16/2024  Impression: No evidence of dural venous sinus thrombosis.     MRI Brain Without Contrast    Result Date: 6/18/2024  MRI BRAIN WO CONTRAST Date of Exam: 6/17/2024 9:12 PM EDT Indication: concern for stroke expansion.  Comparison: MRI brain June 16, 2024 Technique:  Routine multiplanar/multisequence sequence images of the brain were obtained without contrast administration. Findings: The area of restricted diffusion in the left frontal lobe is similar to the prior MRI. There is a tiny punctate area of restricted diffusion in the left parietal area which is suggested on the prior exam as well. This looks more cortical. There are some minimal T2 signal changes in the periventricular areas which could reflect more chronic small vessel ischemic change. No acute intraorbital abnormality is appreciated. There is some minimal sphenoid sinus disease. There are also some left mastoid disease which has been suggested.     Impression: Impression: 1.No significant progression in the area of infarction involving the territory of the left middle cerebral artery in the frontal area. 2.Minimal periventricular white matter changes which could reflect more chronic small vessel ischemic change. 3.There is some sphenoid sinus and left mastoid disease which has been noted Electronically Signed: Bubba Villegas MD  6/18/2024 7:50 AM EDT  Workstation ID: [id number]    MRI  Brain Without Contrast    Result Date: 6/16/2024  MRI BRAIN WO CONTRAST Date of Exam: 6/16/2024 10:21 PM EDT Indication: Stroke.  Comparison: CT head/angiography 6/16/2024. Technique:  Routine multiplanar/multisequence sequence images of the brain were obtained without contrast administration. Findings: There is diffusion restriction in the posterior left frontal lobe laterally consistent with acute/subacute infarct. There is no evidence of acute or chronic intracranial hemorrhage. There is associated mild FLAIR hyperintense signal. There is otherwise minimal patchy periventricular FLAIR hyperintense signal abnormality in the brain. The basal ganglia, brainstem and cerebellum appear unremarkable. The major arterial flow voids appear intact. No mass effect, midline shift or abnormal extra-axial collection. There is thinning of the orbital lenses bilaterally. The paranasal sinuses and right mastoid air cells appear well aerated. There is a small left mastoid effusion. The midline structures appear intact. Calvarial and superficial soft tissue signal is within normal limits. 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     Impression: Impression: 1.Acute/subacute infarct in the posterior left frontal lobe. 2.Minimal chronic small vessel ischemic change. 3.Small left mastoid effusion. 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 Otherwise no vessel stenosis or occlusion is appreciated.    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 Otherwise no vessel stenosis or occlusion is appreciated.    CT Head Without Contrast 6/16/2024  Impression: 1. No intracranial hemorrhage 2. No CT changes of acute vascular territory brain ischemia at this time.    Results for orders placed during the hospital encounter of 06/16/24    Adult Transthoracic Echo  Complete W/ Cont if Necessary Per Protocol (With Agitated Saline)    Interpretation Summary    Left ventricular systolic function is normal. Calculated left ventricular EF = 60.6%    Left ventricular diastolic function is consistent with grade III restrictive pattern without sequela of long term elevated LAP (LA volume is normal)    Normal right ventricular cavity size, wall thickness and systolic function noted.    Normal left atrial size and volume noted. Saline test results are negative.    There is calcification of the aortic valve. The aortic valve appears trileaflet. No significant aortic valve regurgitation is present. No hemodynamically significant aortic valve stenosis is present.    There is calcification of the mitral valve. Mild mitral valve regurgitation is present. No significant mitral valve stenosis is present.    No significant tricuspid valve regurgitation or significant stenosis present. Insufficient TR velocity profile to estimate the right ventricular systolic pressure.    EEG 6/17/2024:  Impression-    Intermittent left frontal temporal slowing, No epileptiform activity is seen. Focal cerebral dysfunction impacting primarily the left anterior hemisphere.  This is consistent with a known left hemispheric stroke. No evidence for epilepsy is seen    Assessment/Plan     Assessment:    Aminah Tellez is a 73-year-old female former smoker PMH HTN, HLD, GERD, anxiety, depression who presented to [facility] for acute onset dysarthria and right hand weakness.  CT head 6/16/2024 without hemorrhage.  CT angiogram head and neck 6/16/2024 revealed 0.4 cm basilar tip aneurysm.  TNK was deferred by [facility] due to aneurysm.  CT angiogram head and neck at outside hospital mentioned possible left transverse and sigmoid sinus thromboses.  Patient was started on heparin drip and nicardipine started for SBP <180 while on heparin drip.  Patient was transferred to [facility] for higher level of care.  We  repeated CT angiogram head and neck, CT venogram, and MR venogram which did not reveal sinus thromboses thus heparin drip and nicardipine were discontinued to allow for permissive hypertension. MRI brain 6/16/2024 revealed acute left frontal lobe infarct.    # Acute left frontal lobe infarct   suspect cardioembolic in etiology, but no known history of atrial fibrillation        -Continue DAPT for 30 days followed by ASA 325mg monotherapy   -Continue atorvastatin 80 mg daily  -TTE is unremarkable   -JAMARI scheduled for 1130 today  -NPO for JAMARI  -Monitor telemetry for atrial fibrillation, order STAT EKG for any rhythm changes   -Holter Monitor at discharge, ordered   -EEG: Negative for any seizure   -Repeat MRI shows no progression of stroke   -PT/OT recommend IPR   -SLP; diet per recommendations  -Neuro-checks per unit protocol while inpatient  -Blood pressure goal: Slowly normalize, overall management per ICU medicine team   -DVT prophylaxis: SCD, enoxaparin  -Okay for telemetry from a neurology perspective if okay with primary team.    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  Neurology will continue to follow.  Please call with any questions or concerns.    CONTRERAS iHnes, AGACNP-[facility] STROKE NEURO  06/19/24  07:27 EDT

## 2024-06-19 NOTE — PLAN OF CARE
Goal Outcome Evaluation:      Patient's VSS throughout shift. No neurological changes noted except requiring reorientation after the JAMARI procedure.    Patient has had several loose/watery stools today.    Patient sitting up in bed with family at bedside.

## 2024-06-19 NOTE — SIGNIFICANT NOTE
06/19/24 1147   SLP Deferred Reason   SLP Deferred Reason Patient unavailable for treatment  (Multiple attempts to see pt this am - was with OT and then having echo, planned to go for JAMARI at 11:30. Spoke briefly with family and will check back as able this pm)

## 2024-06-19 NOTE — PLAN OF CARE
Goal Outcome Evaluation:  Plan of Care Reviewed With: patient, son        Progress: improving  Outcome Evaluation: Pt demo improvement in speech, RUE dexterity and self-care in comparison to previous session. pt continues to benefit from IPOT services to address deficits in order to progress towards PLOF. d/c rec continues to be IPR at this time, will monitor closely.      Anticipated Discharge Disposition (OT): inpatient rehabilitation facility

## 2024-06-19 NOTE — PROGRESS NOTES
Pulmonary/Critical Care Follow-up     LOS: 2 days   Patient Care Team:  Yung Marie MD as PCP - General (Internal Medicine)    Chief Complaint: Strokelike symptoms      Subjective     History reviewed and updated in EMR as indicated.    Interval History:     Patient is doing well.  She continues to have significant expressive aphasia.  No fevers or chills.  N.p.o. for JAMARI.  No other complaints.  Ambulated in the hurtado without difficulty.     History taken from: Chart, staff.  History limited from patient due to expressive aphasia.    PMH/FH/Social History were reviewed and updated appropriately in the electronic medical record.     Review of Systems:    Review of 14 systems was completed with positives and pertinent negatives noted in the subjective section.  All other systems reviewed and are negative.   Exceptions are noted below:    Limited secondary to expressive aphasia.      Objective     Vital Signs  Temp:  [97.2 °F (36.2 °C)-98.2 °F (36.8 °C)] 98.2 °F (36.8 °C)  Heart Rate:  [62-97] 95  Resp:  [16-18] 18  BP: (123-199)/() 178/97  06/18 0701 - 06/19 0700  In: 525 [P.O.:525]  Out: -   Body mass index is 27.73 kg/m².     IV drips:        Physical Exam:     Constitutional:   Alert, in no acute distress   Head:   Normocephalic, atraumatic   Eyes:           Lids and lashes normal, conjunctivae and sclerae normal.  PER   ENMT:  Ears appear intact with no abnormalities noted     Lips normal.     Neck:  Trachea midline, no JVD   Lungs/Resp:    Normal effort, symmetric chest rise, no crepitus, clear to      auscultation bilaterally.               Heart/CV:   Regular rhythm and normal rate, no murmur   Abdomen/GI:    Soft, nontender, nondistended   :    Deferred   Extremities/MSK:  No clubbing or cyanosis.  No edema.     Pulses:  Pulses palpable and equal bilaterally   Skin:  No bleeding, bruising or rash   Heme/Lymph:  No cervical or supraclavicular adenopathy.   Neurologic:      Psychiatric:    Moves  all extremities with no obvious focal motor deficit.  Mild right facial droop.  Significant expressive aphasia.    Non-agitated, normal affect.    The above physical exam findings were reviewed and reflect my exam findings as of today's exam.   Electronically signed by:  John Marroquin MD  06/19/24  14:56 EDT      Results Review:     I reviewed the patient's new clinical results.   Results from last 7 days   Lab Units 06/17/24  1830 06/17/24  0526   SODIUM mmol/L  --  138   POTASSIUM mmol/L 4.0 3.6   CHLORIDE mmol/L  --  97*   CO2 mmol/L  --  30.0*   BUN mg/dL  --  11   CREATININE mg/dL  --  0.60   CALCIUM mg/dL  --  8.9   GLUCOSE mg/dL  --  105*     Results from last 7 days   Lab Units 06/17/24  0526   WBC 10*3/mm3 11.85*   HEMOGLOBIN g/dL 12.5   HEMATOCRIT % 37.0   PLATELETS 10*3/mm3 291         Results from last 7 days   Lab Units 06/18/24  0018 06/17/24  0526   MAGNESIUM mg/dL 2.3 1.4*       I reviewed the patient's new imaging including images and reports.    MRI brain 6/17/2024:    Impression:  1.No significant progression in the area of infarction involving the territory of the left middle cerebral artery in the frontal area.  2.Minimal periventricular white matter changes which could reflect more chronic small vessel ischemic change.  3.There is some sphenoid sinus and left mastoid disease which has been noted    MRI brain 6/16/2024:  Impression:  1.Acute/subacute infarct in the posterior left frontal lobe.  2.Minimal chronic small vessel ischemic change.  3.Small left mastoid effusion.    MRV brain 6/16/2024:    IMPRESSION:  Impression:  No evidence of dural venous sinus thrombosis.    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 Otherwise no vessel stenosis or occlusion is appreciated.       Medication Review:   aspirin, 81 mg, Oral, Daily   Or  aspirin, 300 mg, Rectal, Daily  atorvastatin, 80 mg, Oral, Nightly  clopidogrel, 75 mg, Oral, Daily  enoxaparin, 40 mg, Subcutaneous,  Daily  fluticasone, 2 spray, Each Nare, Daily  pantoprazole, 40 mg, Oral, Q AM  senna-docusate sodium, 2 tablet, Oral, BID  sodium chloride, 10 mL, Intravenous, Q12H             Assessment & Plan         Acute CVA (cerebrovascular accident)    Essential hypertension    Basilar artery aneurysm    Stroke    73 y.o. lifetime non-smoker with history of hypertension, hyperlipidemia, anxiety and occasional Xanax use, admitted to the ICU on 6/16/2024 after transfer from an outside facility for presumptive diagnosis of CVA.  Patient was normal on the morning of 6/16/2024 at 10 AM.  She took a nap and woke up at noon with right-sided weakness and slurred speech that improved transiently.  She went to her local hospital where initial NIH was 1.  She had hypertension treated with Cardene and subsequently had worsening NIH stroke scale to 10.  Cardene was discontinued.  There was some concern on outside imaging of possible thrombus in the left sigmoid sinus and partially in the left transverse sinus.  Patient was transferred to [facility] where an MRA/MRV and CT venogram showed a 4 mm basilar artery tip aneurysm but no evidence of venous sinus thrombosis.  MRI brain did show a left posterior frontal acute infarct.    Patient is doing better.  Speech improving no worsening.  Repeat MRI without progression or new finding.  JAMARI 6/19/2024 showed no evidence of cardiac source of emboli.    Given findings of sinusitis, I I ordered Flonase.  Blood cultures are no growth to 24 hours.  Considered course of Augmentin however symptoms and imaging findings are minimal.    Plan:  1.  For acute CVA: Continue aspirin/statin/Plavix.  Neurology following.  PT/OT/speech therapy.  Stroke order set.  JAMARI 6/19 without cardioembolic source identified.  2.  For hypertension: Allowing permissive hypertension with goal BP less than 220/110 per neurology with plan to slowly normalize blood pressure parameters.  Restart Toprol-XL now.  Likely restart  amlodipine soon.  3.  For basilar artery 0.4 cm aneurysm: Plan to follow-up as outpatient in neurology clinic.  4.  DVT prophylaxis: Lovenox.    Okay to telemetry per neurology.    Electronically signed by:    John Marroquin MD  06/19/24  14:56 EDT      *. Please note that portions of this note were completed with [employer] - a voice recognition program.

## 2024-06-20 VITALS
BODY MASS INDEX: 27.73 KG/M2 | SYSTOLIC BLOOD PRESSURE: 195 MMHG | DIASTOLIC BLOOD PRESSURE: 95 MMHG | TEMPERATURE: 97.8 F | RESPIRATION RATE: 20 BRPM | HEART RATE: 71 BPM | WEIGHT: 156.53 LBS | OXYGEN SATURATION: 98 % | HEIGHT: 63 IN

## 2024-06-20 LAB
ALT SERPL W P-5'-P-CCNC: 11 U/L (ref 1–33)
ANION GAP SERPL CALCULATED.3IONS-SCNC: 11 MMOL/L (ref 5–15)
AST SERPL-CCNC: 12 U/L (ref 1–32)
BUN SERPL-MCNC: 12 MG/DL (ref 8–23)
BUN/CREAT SERPL: 21.1 (ref 7–25)
CALCIUM SPEC-SCNC: 8.5 MG/DL (ref 8.6–10.5)
CHLORIDE SERPL-SCNC: 103 MMOL/L (ref 98–107)
CO2 SERPL-SCNC: 25 MMOL/L (ref 22–29)
CREAT SERPL-MCNC: 0.57 MG/DL (ref 0.57–1)
DEPRECATED RDW RBC AUTO: 43.6 FL (ref 37–54)
EGFRCR SERPLBLD CKD-EPI 2021: 96.1 ML/MIN/1.73
ERYTHROCYTE [DISTWIDTH] IN BLOOD BY AUTOMATED COUNT: 13.4 % (ref 12.3–15.4)
GLUCOSE SERPL-MCNC: 123 MG/DL (ref 65–99)
HCT VFR BLD AUTO: 39.6 % (ref 34–46.6)
HGB BLD-MCNC: 12.9 G/DL (ref 12–15.9)
MCH RBC QN AUTO: 28.9 PG (ref 26.6–33)
MCHC RBC AUTO-ENTMCNC: 32.6 G/DL (ref 31.5–35.7)
MCV RBC AUTO: 88.6 FL (ref 79–97)
PLATELET # BLD AUTO: 276 10*3/MM3 (ref 140–450)
PMV BLD AUTO: 9.6 FL (ref 6–12)
POTASSIUM SERPL-SCNC: 3.2 MMOL/L (ref 3.5–5.2)
QT INTERVAL: 424 MS
QTC INTERVAL: 477 MS
RBC # BLD AUTO: 4.47 10*6/MM3 (ref 3.77–5.28)
SODIUM SERPL-SCNC: 139 MMOL/L (ref 136–145)
WBC NRBC COR # BLD AUTO: 8.58 10*3/MM3 (ref 3.4–10.8)

## 2024-06-20 PROCEDURE — 99232 SBSQ HOSP IP/OBS MODERATE 35: CPT

## 2024-06-20 PROCEDURE — 92507 TX SP LANG VOICE COMM INDIV: CPT

## 2024-06-20 PROCEDURE — 92526 ORAL FUNCTION THERAPY: CPT

## 2024-06-20 PROCEDURE — 33285 INSJ SUBQ CAR RHYTHM MNTR: CPT | Performed by: INTERNAL MEDICINE

## 2024-06-20 PROCEDURE — 0JH632Z INSERTION OF MONITORING DEVICE INTO CHEST SUBCUTANEOUS TISSUE AND FASCIA, PERCUTANEOUS APPROACH: ICD-10-PCS | Performed by: INTERNAL MEDICINE

## 2024-06-20 PROCEDURE — 99222 1ST HOSP IP/OBS MODERATE 55: CPT

## 2024-06-20 PROCEDURE — 25010000002 ENOXAPARIN PER 10 MG

## 2024-06-20 PROCEDURE — 80048 BASIC METABOLIC PNL TOTAL CA: CPT | Performed by: INTERNAL MEDICINE

## 2024-06-20 PROCEDURE — 85027 COMPLETE CBC AUTOMATED: CPT | Performed by: INTERNAL MEDICINE

## 2024-06-20 PROCEDURE — 84460 ALANINE AMINO (ALT) (SGPT): CPT

## 2024-06-20 PROCEDURE — 99239 HOSP IP/OBS DSCHRG MGMT >30: CPT | Performed by: NURSE PRACTITIONER

## 2024-06-20 PROCEDURE — 84450 TRANSFERASE (AST) (SGOT): CPT

## 2024-06-20 PROCEDURE — 93005 ELECTROCARDIOGRAM TRACING: CPT

## 2024-06-20 RX ORDER — ASPIRIN 325 MG
325 TABLET ORAL DAILY
Start: 2024-07-09 | End: 2025-07-09

## 2024-06-20 RX ORDER — CLOPIDOGREL BISULFATE 75 MG/1
75 TABLET ORAL DAILY
Qty: 30 TABLET | Refills: 0 | Status: SHIPPED | OUTPATIENT
Start: 2024-06-21

## 2024-06-20 RX ORDER — BISOPROLOL FUMARATE 5 MG/1
5 TABLET, FILM COATED ORAL
Status: DISCONTINUED | OUTPATIENT
Start: 2024-06-20 | End: 2024-06-20 | Stop reason: HOSPADM

## 2024-06-20 RX ORDER — POTASSIUM CHLORIDE 1.5 G/1.58G
40 POWDER, FOR SOLUTION ORAL EVERY 4 HOURS
Status: COMPLETED | OUTPATIENT
Start: 2024-06-20 | End: 2024-06-20

## 2024-06-20 RX ORDER — LOSARTAN POTASSIUM 25 MG/1
25 TABLET ORAL
Start: 2024-06-21

## 2024-06-20 RX ORDER — ASPIRIN 81 MG/1
81 TABLET, CHEWABLE ORAL DAILY
Start: 2024-06-21

## 2024-06-20 RX ORDER — ASPIRIN 325 MG
325 TABLET ORAL DAILY
Qty: 90 TABLET | Refills: 3 | Status: SHIPPED | OUTPATIENT
Start: 2024-06-20 | End: 2024-06-20

## 2024-06-20 RX ORDER — BISOPROLOL FUMARATE 5 MG/1
5 TABLET, FILM COATED ORAL
Start: 2024-06-21

## 2024-06-20 RX ORDER — LOSARTAN POTASSIUM 25 MG/1
25 TABLET ORAL
Status: DISCONTINUED | OUTPATIENT
Start: 2024-06-20 | End: 2024-06-20 | Stop reason: HOSPADM

## 2024-06-20 RX ORDER — EZETIMIBE 10 MG/1
10 TABLET ORAL DAILY
Qty: 30 TABLET | Refills: 11 | Status: SHIPPED | OUTPATIENT
Start: 2024-06-20 | End: 2025-06-20

## 2024-06-20 RX ORDER — FLUTICASONE PROPIONATE 50 MCG
2 SPRAY, SUSPENSION (ML) NASAL DAILY
Start: 2024-06-21

## 2024-06-20 RX ORDER — POTASSIUM CHLORIDE 20 MEQ/1
40 TABLET, EXTENDED RELEASE ORAL EVERY 4 HOURS
Status: DISCONTINUED | OUTPATIENT
Start: 2024-06-20 | End: 2024-06-20

## 2024-06-20 RX ORDER — ASPIRIN 81 MG/1
81 TABLET, CHEWABLE ORAL DAILY
Qty: 30 TABLET | Refills: 0 | Status: SHIPPED | OUTPATIENT
Start: 2024-06-21 | End: 2024-06-20

## 2024-06-20 RX ORDER — ATORVASTATIN CALCIUM 80 MG/1
80 TABLET, FILM COATED ORAL NIGHTLY
Start: 2024-06-20

## 2024-06-20 RX ADMIN — ENOXAPARIN SODIUM 40 MG: 100 INJECTION SUBCUTANEOUS at 08:31

## 2024-06-20 RX ADMIN — LOSARTAN POTASSIUM 25 MG: 25 TABLET, FILM COATED ORAL at 10:56

## 2024-06-20 RX ADMIN — PANTOPRAZOLE SODIUM 40 MG: 40 TABLET, DELAYED RELEASE ORAL at 05:52

## 2024-06-20 RX ADMIN — METOPROLOL SUCCINATE 50 MG: 50 TABLET, EXTENDED RELEASE ORAL at 08:31

## 2024-06-20 RX ADMIN — CLOPIDOGREL BISULFATE 75 MG: 75 TABLET ORAL at 08:31

## 2024-06-20 RX ADMIN — ASPIRIN 81 MG: 81 TABLET, CHEWABLE ORAL at 08:31

## 2024-06-20 RX ADMIN — POTASSIUM CHLORIDE 40 MEQ: 1.5 POWDER, FOR SOLUTION ORAL at 05:52

## 2024-06-20 RX ADMIN — Medication 10 ML: at 08:31

## 2024-06-20 RX ADMIN — POTASSIUM CHLORIDE 40 MEQ: 1.5 POWDER, FOR SOLUTION ORAL at 10:08

## 2024-06-20 RX ADMIN — FLUTICASONE PROPIONATE 2 SPRAY: 50 SPRAY, METERED NASAL at 08:31

## 2024-06-20 RX ADMIN — BISOPROLOL FUMARATE 5 MG: 5 TABLET ORAL at 12:42

## 2024-06-20 NOTE — CONSULTS
Date of Hospital Visit: 24  Encounter Provider: Danielle Calero PA-C  Place of Service: [facility]  Patient Name: Aminah Calderon  :1950  Referral Provider: William Jenkins MD  Primary Care Provider: Yung Marie MD    Chief complaint/Reason for Consultation: CVA, suspected cardioembolic source    Problem List:  CVA  MRI 2024: Left posterior frontal acute infarct  JAMARI 2024: No evidence of cardiac source of emboli  Basilar artery aneurysm measuring 0.4 cm  Hypertension  Dyslipidemia  Anxiety      History of Present Illness:  Patient is a 73-year-old female with the above past medical history who we are asked to see in consultation today for recent CVA, suspected to be cardioembolic.  She has no known cardiac history.  She has not had any atrial fibrillation on telemetry thus far.  She did have a JAMARI did not reveal any intracardiac source of emboli and the bubble study was negative.  Neurology requesting loop recorder implantation. Patient denies any cardiac history. Denies any known palpitations. Issues with dysarthria. Speech improving. Plans to [facility] today.    History reviewed. No pertinent past medical history.    History reviewed. No pertinent surgical history.    Medications Prior to Admission   Medication Sig Dispense Refill Last Dose    Biotin 5000 MCG tablet Take  by mouth.       cephalexin (KEFLEX) 250 MG capsule Take 1 capsule by mouth Daily.       estrogens, conjugated, (PREMARIN) 1.25 MG tablet Take 1 tablet by mouth Daily.       metoprolol tartrate (LOPRESSOR) 50 MG tablet Take 1 tablet by mouth Daily.          Social History     Socioeconomic History    Marital status:    Tobacco Use    Smoking status: Former     Current packs/day: 0.00     Types: Cigarettes     Quit date:      Years since quittin.4    Smokeless tobacco: Never   Vaping Use    Vaping status: Never Used   Substance and Sexual Activity    Alcohol use: Not Currently    Drug use:  "Never       History reviewed. No pertinent family history.       Objective:     Vitals:    06/20/24 0840 06/20/24 1000 06/20/24 1156 06/20/24 1200   BP: (!) 185/87  (!) 195/95    BP Location: Left arm  Left arm    Patient Position: Sitting  Lying    Pulse: 77 73 74 71   Resp:  20  20   Temp:    97.8 °F (36.6 °C)   TempSrc:    Oral   SpO2:  95% 97% 98%   Weight:       Height:         Body mass index is 27.73 kg/m².  Flowsheet Rows      Flowsheet Row First Filed Value   Admission Height 160 cm (63\") Documented at 06/16/2024 2000   Admission Weight 71.6 kg (157 lb 13.6 oz) Documented at 06/16/2024 2000            Physical Exam   General: No acute distress, well developed and well nourished.    Skin: Skin is warm and dry. No obvious cyanosis, erythema or pallor.   HEENT: Atraumatic, normocephalic, no conjunctival pallor, no scleral icterus.   Neck: Supple, no JVD. Normal carotid upstrokes, no bruits.    Chest:No respiratory distress. No chest wall tenderness. Breath sounds are normal. No wheezes, rhonchi or rales.  Cardiovascular: Normal S1 and S2, no murmur, gallop or rub. PMI is not displaced.    Pulses:Radial and pedal pulses are 2+ and symmetric.    Abdomen: Soft, nontender, normal bowel sounds.   Musculoskeletal/Extremities:  No clubbing, cyanosis or edema. No gross deformity.   Psychiatric: Normal mood and affect.Speech and behavior is normal.    Lab Review:                Results from last 7 days   Lab Units 06/20/24  0402   SODIUM mmol/L 139   POTASSIUM mmol/L 3.2*   CHLORIDE mmol/L 103   CO2 mmol/L 25.0   BUN mg/dL 12   CREATININE mg/dL 0.57   GLUCOSE mg/dL 123*   CALCIUM mg/dL 8.5*         Results from last 7 days   Lab Units 06/20/24  0402   WBC 10*3/mm3 8.58   HEMOGLOBIN g/dL 12.9   HEMATOCRIT % 39.6   PLATELETS 10*3/mm3 276         Results from last 7 days   Lab Units 06/18/24  0018   MAGNESIUM mg/dL 2.3     Results from last 7 days   Lab Units 06/17/24  0526   CHOLESTEROL mg/dL 185   TRIGLYCERIDES mg/dL " 211*   HDL CHOL mg/dL 67*   LDL CHOL mg/dL 83     @LABRCNT(bnp)@  Imaging Results (Last 24 Hours)       ** No results found for the last 24 hours. **             Tele: NSR  ECG: NSR      Results for orders placed during the hospital encounter of 06/16/24    Adult Transesophageal Echo (JAMARI) W/ Cont if Necessary Per Protocol    Interpretation Summary    Left ventricular ejection fraction appears to be 56 - 60%.    Moderate mitral valve regurgitation is present.    No evidence of a left atrial appendage thrombus    Saline test results are negative.    Moderate mitral valve regurgitation is present.    The aortic valve exhibits sclerosis. Mild aortic valve regurgitation is present.    There is mild, (grade 2) plaque in the aortic arch present.             Assessment:   Acute left frontal lobe infarct  Confirmed on MRI  JAMARI negative for intracardiac source.  Bubble study negative.  Hypertension, uncontrolled  Dyslipidemia  Basilar artery aneurysm measuring 0.4 cm  Hypokalemia, replace per protocol    Plan:   Will plan for loop recorder implantation prior to discharge. If there is confirmed atrial fibrillation, patient will need NOAC for stroke prophylaxis.   Add losartan 25mg daily for better BP control. Can uptitrate as necessary for adequate BP control. Will not be too aggressive due to recent CVA.   Discontinue Toprol and start bisoprolol 5mg daily for better BP control.   Continue aspirin, plavix, and statin for CVA.  Obtain ECG for baseline.  Replace electrolytes per protocol.     Danielle Calero PA-C

## 2024-06-20 NOTE — DISCHARGE PLACEMENT REQUEST
"Ricky Calderon (73 y.o. Female)       Date of Birth   1950    Social Security Number       Address   [address] [place] [id number]    Home Phone   [phone]    MRN   [id number]       [facility]    Marital Status                               Admission Date   24    Admission Type   Elective    Admitting Provider   John Marroquin MD    Attending Provider   John Marroquin MD    Department, Room/Bed   [facility] 2B ICU, N229/1       Discharge Date       Discharge Disposition   Rehab Facility or Unit (DC - External)    Discharge Destination                                 Attending Provider: John Marroquin MD    Allergies: No Known Allergies    Isolation: None   Infection: None   Code Status: CPR    Ht: 160 cm (62.99\")   Wt: 71 kg (156 lb 8.4 oz)    Admission Cmt: None   Principal Problem: Acute CVA (cerebrovascular accident) [I63.9]                   Active Insurance as of 2024       Primary Coverage       Payor Plan Insurance Group Employer/Plan Group    HUMANA MEDICARE REPLACEMENT HU[employer] MED ADV GROUP [id number]       Payor Plan Address Payor Plan Phone Number Payor Plan Fax Number Effective Dates    [address] [address] [phone]  2018 - None Entered    [facility] [id number]         Subscriber Name Subscriber Birth Date Member ID       RICKY CALDERON 1950 [id number]                     Emergency Contacts        (Rel.) Home Phone Work Phone Mobile Phone    TRINY CALDERON (Son) [phone] -- [phone]    BOB CALDERON (Son) [phone] -- --                 Discharge Summary        Crista Khan, DNP, APRN at 24 0757            DISCHARGE SUMMARY       Patient name: Ricky Calderon  CSN: [id number]  MRN: [id number]  : 1950    Date of Admission: 2024  Date of Discharge:  2024    Admitting Physician: Maikol Engel MD   Primary Care Provider: Yung Marie MD  Consultations:   Clau Hinojosa, " MD Neurology   Jayro Mitchell MD Cardiology     Admission Diagnosis: Acute CVA      Discharge Diagnoses:     Acute CVA (cerebrovascular accident)    Essential hypertension    Basilar artery aneurysm    Stroke    Procedures:  6/19/24: Transesophageal echocardiogram   6/20/24: Loop recorder implantation     Imaging:  MRI Brain Without Contrast    Result Date: 6/18/2024  Impression: 1.No significant progression in the area of infarction involving the territory of the left middle cerebral artery in the frontal area. 2.Minimal periventricular white matter changes which could reflect more chronic small vessel ischemic change. 3.There is some sphenoid sinus and left mastoid disease which has been noted Electronically Signed: Bubba Villegas MD  6/18/2024 7:50 AM EDT  Workstation ID: [id number]    EEG    Result Date: 6/17/2024  Focal cerebral dysfunction impacting primarily the left anterior hemisphere.  This is consistent with a known left hemispheric stroke No evidence for epilepsy is seen This report is transcribed using the Dragon dictation system.      MRI Brain Without Contrast    Result Date: 6/16/2024  Impression: 1.Acute/subacute infarct in the posterior left frontal lobe. 2.Minimal chronic small vessel ischemic change. 3.Small left mastoid effusion. Electronically Signed: Jose Ontiveros MD  6/16/2024 11:22 PM EDT  Workstation ID: [id number]    MRI Angiogram Venogram Head    Result Date: 6/16/2024  Impression: No evidence of dural venous sinus thrombosis. Electronically Signed: Jose Ontiveros MD  6/16/2024 10:55 PM EDT  Workstation ID: [id number]    CT venogram head    Result Date: 6/16/2024  0.4 cm aneurysm of the basilar artery tip. Otherwise no vessel stenosis or occlusion is appreciated. Electronically Signed: Yg Denise MD  6/16/2024 7:23 PM EDT  Workstation ID: [id number]    CT Angiogram Head w AI Analysis of LVO    Result Date: 6/16/2024  0.4 cm aneurysm of the basilar artery tip. Otherwise no vessel stenosis  or occlusion is appreciated. Electronically Signed: Yg Denise MD  6/16/2024 7:23 PM EDT  Workstation ID: [id number]    CT Head Without Contrast Stroke Protocol    Result Date: 6/16/2024  Impression: 1. No intracranial hemorrhage 2. No CT changes of acute vascular territory brain ischemia at this time Electronically Signed: Srinath Godfrey  6/16/2024 7:12 PM EDT  Workstation ID: [id number]     History of Present Illness (copied from H&P note on 6/16/24):  73-year-old female admitted to the intensive care unit on 6/16/2024 in transfer from an outside hospital for presumptive diagnosis of CVA     Patient has no history of prior neurologic event.  Past history of hypertension hyperlipidemia.  Last known well at 10 AM.  Woke up at around noon with right-sided weakness and slurred speech that improved transiently.  She went to her local hospital where she had an NIH stroke scale of 1.  She had hypertension treated with Cardene and subsequently had worsening NIH stroke scale that increased to 10.  Cardene was discontinued.  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     She was transferred to [facility] where on examination she has persistent dysarthria and right upper extremity weakness.  No past history of CVA or other neurologic deficit.     Head imaging on arrival to [facility] revealed no acute intracranial abnormality, no LVO or other abnormalities other than a 0.4 cm basilar artery tip aneurysm.     Patient occasionally takes Xanax for anxiety  Non-smoker and denies illicit drug use (end of copied text)    Hospital Course:  Patient was admitted to ICU 2* issues discussed in the above HPI.     She was evaluated by Neurology with further workup performed including MRI showing acute / subacute infarct in the posterior left frontal lobe, MRA/V negative for evidence of dural venous sinus thrombosis, and EEG which was negative for  "seizures. Heparin infusion was discontinued and patient was placed on DAPT in addition to high-intensity statin therapy. TTE showed LVEF 60% with grade III LV diastolic dysfunction and saline test results negative for PFO, with Cardiology consulted and subsequent JAMARI negative for intracardiac thrombus or definitive etiology of stroke. The decision was made to place an implantable loop recorder for long term atrial fibrillation monitoring (done 6/20/24) as no AF has been observed during hospitalization.     Patient is felt to have reached maximum benefit from this hospitalization and has been deemed appropriate for discharge. She is feeling well, is tolerating a PO diet, and is ambulating without significant difficulty. She has worked with PT & OT, both of whom have recommended continued inpatient rehabilitation upon discharge as she still has notable neurologic deficits. She was ultimately accepted by [facility] and will be transported today via family.     Vitals:  BP (!) 195/95 (BP Location: Left arm, Patient Position: Lying)   Pulse 71   Temp 97.8 °F (36.6 °C) (Oral)   Resp 20   Ht 160 cm (62.99\")   Wt 71 kg (156 lb 8.4 oz)   SpO2 98%   BMI 27.73 kg/m²     Physical Exam:  Constitutional:  Well-appearing elderly female sitting up in bed. No distress.   HEENT:  Normocephalic and atraumatic. PER  Neck: Normal range of motion. Neck supple. No JVD present.   Cardiovascular: Normal rate, regular rhythm, normal heart sounds and intact distal pulses.  No gallop and no friction rub.  No murmur heard.  Pulmonary/Chest: Effort normal and breath sounds normal. No respiratory distress. No wheezes, rhonchi or rales.   Abdominal: Soft. No distension and no mass. There is no tenderness.   Musculoskeletal: Moves all extremities.   Neurological: Alert and oriented to person, place, and time. Mild right facial droop. Notable expressive aphasia and dysarthria.   Skin: Skin is warm and dry. No rash noted.   Extremities:  No " clubbing, edema or cyanosis  Psychiatric: Normal mood and affect. Behavior is normal.     Interval:  (Return from MRI)  1a. Level of Consciousness: 0-->Alert, keenly responsive  1b. LOC Questions: 0-->Answers both questions correctly  1c. LOC Commands: 0-->Performs both tasks correctly  2. Best Gaze: 0-->Normal  3. Visual: 0-->No visual loss  4. Facial Palsy: 2-->Partial paralysis (total or near-total paralysis of lower face)  5a. Motor Arm, Left: 0-->No drift, limb holds 90 (or 45) degrees for full 10 secs  5b. Motor Arm, Right: 0-->No drift, limb holds 90 (or 45) degrees for full 10 secs  6a. Motor Leg, Left: 0-->No drift, leg holds 30 degree position for full 5 secs  6b. Motor Leg, Right: 0-->No drift, leg holds 30 degree position for full 5 secs  7. Limb Ataxia: 0-->Absent  8. Sensory: 0-->Normal, no sensory loss  9. Best Language: 1-->Mild-to-moderate aphasia, some obvious loss of fluency or facility of comprehension, without significant limitation on ideas expressed or form of expression. Reduction of speech and/or comprehension, however, makes conversation. . . (see row details)  10. Dysarthria: 2-->Severe dysarthria, patients speech is so slurred as to be unintelligible in the absence of or out of proportion to any dysphasia, or is mute/anarthric  11. Extinction and Inattention (formerly Neglect): 0-->No abnormality    Total (NIH Stroke Scale): 5    Labs:  Results from last 7 days   Lab Units 06/20/24  0402   WBC 10*3/mm3 8.58   HEMOGLOBIN g/dL 12.9   HEMATOCRIT % 39.6   PLATELETS 10*3/mm3 276     Results from last 7 days   Lab Units 06/20/24  0402   SODIUM mmol/L 139   POTASSIUM mmol/L 3.2*   CHLORIDE mmol/L 103   CO2 mmol/L 25.0   BUN mg/dL 12   CREATININE mg/dL 0.57   CALCIUM mg/dL 8.5*   ALT (SGPT) U/L 11   AST (SGOT) U/L 12   GLUCOSE mg/dL 123*         Magnesium   Date Value Ref Range Status   06/18/2024 2.3 1.6 - 2.4 mg/dL Final     Comment:     Result checked           Discharge Medications:      Discharge Medications        New Medications        Instructions Start Date   aspirin 81 MG chewable tablet   81 mg, Oral, Daily   Start Date: June 21, 2024     aspirin 325 MG tablet  Commonly known as: Russel Aspirin   325 mg, Oral, Daily   Start Date: July 9, 2024     atorvastatin 80 MG tablet  Commonly known as: LIPITOR   80 mg, Oral, Nightly      bisoprolol 5 MG tablet  Commonly known as: ZEBeta   5 mg, Oral, Every 24 Hours Scheduled   Start Date: June 21, 2024     clopidogrel 75 MG tablet  Commonly known as: PLAVIX   75 mg, Oral, Daily, Stop medication on 7/8/2024   Start Date: June 21, 2024     ezetimibe 10 MG tablet  Commonly known as: Zetia   10 mg, Oral, Daily      fluticasone 50 MCG/ACT nasal spray  Commonly known as: FLONASE   2 sprays, Each Nare, Daily   Start Date: June 21, 2024     losartan 25 MG tablet  Commonly known as: COZAAR   25 mg, Oral, Every 24 Hours Scheduled   Start Date: June 21, 2024            Stop These Medications      Biotin 5000 MCG tablet     cephalexin 250 MG capsule  Commonly known as: KEFLEX     estrogens (conjugated) 1.25 MG tablet  Commonly known as: PREMARIN     metoprolol tartrate 50 MG tablet  Commonly known as: LOPRESSOR            Discharge Diet:   Diet Instructions       Diet: Regular/House Diet; Regular (IDDSI 7); Thin (IDDSI 0)      Discharge Diet: Regular/House Diet    Texture: Regular (IDDSI 7)    Fluid Consistency: Thin (IDDSI 0)          Activity at Discharge:    Activity Instructions       Activity as Tolerated            Follow-up Appointments  Future Appointments   Date Time Provider Department Center   6/27/2024  1:00 PM WOUND CHECK [facility]   7/30/2024  9:00 AM Laina Smith APRN [facility]   9/25/2024 11:30 AM Maikol Ortega MD [facility]     Additional Instructions for the Follow-ups that You Need to Schedule       Discharge Follow-up with PCP   As directed       Currently Documented PCP:    Yung Marie MD    PCP Phone Number:     [phone]     Follow Up Details: 1 week        Discharge Follow-up with Specialty: Neurology Stroke; 1 Month   As directed      Specialty: Neurology Stroke   Follow Up: 1 Month              Discharge Instructions:  Discharge to [facility] for continued rehabilitation   Medications as above; continue 81 mg ASA + Plavix until 7/8 (on 7/9 stop Plavix and increase ASA to 325 mg daily)    Follow up with providers as above     Current Code Status       Date Active Code Status Order ID Comments User Context       6/18/2024 1115 CPR (Attempt to Resuscitate) [id number]  John Marroquin MD Inpatient        Question Answer    Code Status (Patient has no pulse and is not breathing) CPR (Attempt to Resuscitate)    Medical Interventions (Patient has pulse or is breathing) Full Support    Level Of Support Discussed With Patient             Crista Khan DNP, CONTRERAS, [facility]  Pulmonary and Critical Care Medicine  06/20/24     Time: Discharge 40 min    CC: Yung Marie MD    Please note that portions of this note were completed with a voice recognition program.     Electronically signed by Crista Khan DNP, APRN at 06/20/24 1400

## 2024-06-20 NOTE — PROCEDURES
Indications: Implantable loop recorder placement for detection of occult atrial fibrillation in the setting of a cryptogenic stroke deemed to possibly be cardioembolic by the neurology team.     Procedure description: The left chest wall was prepped and draped in sterile fashion. The fourth intercostal space was identified. The subcutaneous tissue approximately 2 cm from the left sternal border was anesthetized. A small incision was made and then a subcutaneous pocket was created using the insertion tool and a Medtronic LINQ II loop recorder was implanted. The device was interrogated and sensing threshold was recorded at approximately 0.67mV. The incision was secured with Steri-strips and covered with Tegaderm.    Medtronic LINQ II LNQ22, serial number: [id number]    FADIA Ortega MD  06/20/24 14:40 EDT

## 2024-06-20 NOTE — PLAN OF CARE
Goal Outcome Evaluation:  SLP treatment completed. Will continue to address dysarthria, dysphagia, and cognitive-linguistic function Please see note for further details and recommendations.       Anticipated Discharge Disposition (SLP): inpatient rehabilitation facility             Treatment Assessment (SLP): continued, dysarthria, oral dysphagia, communication disorder (06/20/24 0918)  Treatment Assessment Comments (SLP): Patient presents with mod-severe dysarthria and ongoing oral dysphagia. patient presents with reduced labial seal, anterior loss, and poor bolus stripping from spoon. Completed labial strengthening exercises. no overt s/sx of aspiration/penetration. Prolonged mastication of complex solids with mild pocketing. not yet appropriate to regular solids d/t prolonged mastication and pocketing. reviewed residue clearing strategies (i..e, lingual sweep, finger sweep, alternating liquids and solids). Reviewed dysarthria strategies and completed pacing exercises during multisyllable productions. (06/20/24 0918)  Plan for Continued Treatment (SLP): continue treatment per plan of care (06/20/24 0918)

## 2024-06-20 NOTE — DISCHARGE INSTRUCTIONS
-Continue Aspirin 81mg and Plavix 75mg until 7/8 (on 7/9 stop Plavix and increase Aspirin to 325mg daily)  -Zetia 10mg nightly for LDL goal of <70 (83 as inpatient)  -Blood pressure monitoring; goal <140/80  -Increase physical activity as tolerated  -Consume heart healthy diet  -Call 911 for stroke symptoms (unilateral weakness, numbness, speech difficulty, visual disturbance, gait instability, dizziness, or sudden severe headache)

## 2024-06-20 NOTE — PROGRESS NOTES
Stroke Neurology Progress Note     Subjective     This patient was seen in follow-up for: left MCA territory infarct    Subjective:  No acute events overnight.  Patient is currently sitting in the bed eating breakfast.  She continues to have mild right-sided weakness, facial droop, and severe dysarthria.  She had her JAMARI completed yesterday which was unrevealing for cardioembolic source.  We have asked cardiology to evaluate her today for possible loop recorder implant prior to discharge.  She is currently tolerating her DAPT with no adverse side effects.    Review of Systems   Constitutional:  Positive for activity change. Negative for chills, fatigue and fever.   HENT:  Negative for trouble swallowing.    Eyes:  Negative for photophobia and visual disturbance.   Respiratory: Negative.  Negative for choking and shortness of breath.    Cardiovascular:  Positive for palpitations. Negative for chest pain.   Gastrointestinal: Negative.  Negative for blood in stool, diarrhea, nausea and vomiting.   Genitourinary: Negative.  Negative for hematuria.   Musculoskeletal:  Positive for gait problem.   Skin: Negative.    Neurological:  Positive for facial asymmetry, speech difficulty, weakness and light-headedness (Occasional and intermittent). Negative for numbness and headaches.   Hematological: Negative.    Psychiatric/Behavioral: Negative.           Objective      Temp:  [97.9 °F (36.6 °C)-98.2 °F (36.8 °C)] 98.2 °F (36.8 °C)  Heart Rate:  [65-96] 72  Resp:  [16-18] 16  BP: (140-199)/() 182/82    Objective      Physical Exam  Vitals and nursing note reviewed.   Constitutional:       General: She is not in acute distress.     Appearance: She is not ill-appearing.   HENT:      Head: Normocephalic and atraumatic.      Mouth/Throat:      Mouth: Mucous membranes are moist.   Eyes:      Extraocular Movements: Extraocular movements intact.      Pupils: Pupils are equal, round, and reactive to light.   Cardiovascular:       Rate and Rhythm: Normal rate and regular rhythm.   Pulmonary:      Effort: Pulmonary effort is normal. No respiratory distress.      Comments: On room air  Skin:     General: Skin is warm and dry.   Neurological:      Mental Status: She is alert and oriented to person, place, and time.      Cranial Nerves: Cranial nerve deficit and dysarthria present.      Sensory: No sensory deficit.      Motor: Weakness present.   Psychiatric:         Mood and Affect: Mood normal.         Behavior: Behavior normal.         Neurological Exam  Mental Status  Alert. Oriented to person, place, time and situation. Oriented to person, place, and time. Severe dysarthria present. Language is fluent with no aphasia. Attention and concentration are normal.    Cranial Nerves  CN II: Visual fields full to confrontation.  CN III, IV, VI: Extraocular movements intact bilaterally. Pupils equal round and reactive to light bilaterally.  CN V: Facial sensation is normal.  CN VII:  Right: There is peripheral facial weakness.  CN VIII: Hearing appears to be intact bilaterally.  CN XII: Tongue midline without atrophy or fasciculations.    Motor  Normal muscle bulk throughout. Normal muscle tone.  Right upper extremity  slightly weaker than left  Bilateral upper extremities with no drift, RUE 4++/5, LUE 5/5  Bilateral lower extremities with no drift, RUE 4++/5, LUE 5/5.    Sensory  Sensation is intact to light touch, pinprick, vibration and proprioception in all four extremities.    Coordination  Right: Finger-to-nose normal.Left: Finger-to-nose normal.    Gait    Not observed.        Results Review:      All brain images and reports were personally reviewed and I agree with the interpretations except as noted below.    MRI Angiogram Venogram Head 6/16/2024  Impression: No evidence of dural venous sinus thrombosis.     MRI Brain Without Contrast    Result Date: 6/18/2024  MRI BRAIN WO CONTRAST Date of Exam: 6/17/2024 9:12 PM EDT Indication: concern  for stroke expansion.  Comparison: MRI brain June 16, 2024 Technique:  Routine multiplanar/multisequence sequence images of the brain were obtained without contrast administration. Findings: The area of restricted diffusion in the left frontal lobe is similar to the prior MRI. There is a tiny punctate area of restricted diffusion in the left parietal area which is suggested on the prior exam as well. This looks more cortical. There are some minimal T2 signal changes in the periventricular areas which could reflect more chronic small vessel ischemic change. No acute intraorbital abnormality is appreciated. There is some minimal sphenoid sinus disease. There are also some left mastoid disease which has been suggested.     Impression: Impression: 1.No significant progression in the area of infarction involving the territory of the left middle cerebral artery in the frontal area. 2.Minimal periventricular white matter changes which could reflect more chronic small vessel ischemic change. 3.There is some sphenoid sinus and left mastoid disease which has been noted Electronically Signed: Bubba Villegas MD  6/18/2024 7:50 AM EDT  Workstation ID: [id number]    MRI Brain Without Contrast    Result Date: 6/16/2024  MRI BRAIN WO CONTRAST Date of Exam: 6/16/2024 10:21 PM EDT Indication: Stroke.  Comparison: CT head/angiography 6/16/2024. Technique:  Routine multiplanar/multisequence sequence images of the brain were obtained without contrast administration. Findings: There is diffusion restriction in the posterior left frontal lobe laterally consistent with acute/subacute infarct. There is no evidence of acute or chronic intracranial hemorrhage. There is associated mild FLAIR hyperintense signal. There is otherwise minimal patchy periventricular FLAIR hyperintense signal abnormality in the brain. The basal ganglia, brainstem and cerebellum appear unremarkable. The major arterial flow voids appear intact. No mass effect, midline  shift or abnormal extra-axial collection. There is thinning of the orbital lenses bilaterally. The paranasal sinuses and right mastoid air cells appear well aerated. There is a small left mastoid effusion. The midline structures appear intact. Calvarial and superficial soft tissue signal is within normal limits. 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     Impression: Impression: 1.Acute/subacute infarct in the posterior left frontal lobe. 2.Minimal chronic small vessel ischemic change. 3.Small left mastoid effusion. 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 Otherwise no vessel stenosis or occlusion is appreciated.    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 Otherwise no vessel stenosis or occlusion is appreciated.    CT Head Without Contrast 6/16/2024  Impression: 1. No intracranial hemorrhage 2. No CT changes of acute vascular territory brain ischemia at this time.    Results for orders placed during the hospital encounter of 06/16/24    Adult Transesophageal Echo (JAMARI) W/ Cont if Necessary Per Protocol    Interpretation Summary    Left ventricular ejection fraction appears to be 56 - 60%.    Moderate mitral valve regurgitation is present.    No evidence of a left atrial appendage thrombus    Saline test results are negative.    Moderate mitral valve regurgitation is present.    The aortic valve exhibits sclerosis. Mild aortic valve regurgitation is present.    There is mild, (grade 2) plaque in the aortic arch present.    EEG 6/17/2024:  Impression-    Intermittent left frontal temporal slowing, No epileptiform activity is seen. Focal cerebral dysfunction impacting primarily the left anterior hemisphere.  This is consistent with a known left hemispheric stroke. No evidence for epilepsy is seen      A1c 5.50  LDL 83,  triglycerides 211  WBC 8.58  H/H 12.9/39.6  Platelets 276  Creatinine 0.57, BUN 12  Na+ 139  Glucose 123    Assessment/Plan     Assessment:    Aminah Tellez is a 73-year-old female former smoker PMH HTN, HLD, GERD, anxiety, depression who presented to [facility] for acute onset dysarthria and right hand weakness.  CT head 6/16/2024 without hemorrhage.  CT angiogram head and neck 6/16/2024 revealed 0.4 cm basilar tip aneurysm.  TNK was deferred by [facility] due to aneurysm.  CT angiogram head and neck at outside hospital mentioned possible left transverse and sigmoid sinus thromboses.  Patient was started on heparin drip and nicardipine started for SBP <180 while on heparin drip.  Patient was transferred to [facility] for higher level of care.  We repeated CT angiogram head and neck, CT venogram, and MR venogram on arrival which did not reveal sinus thromboses thus heparin drip and nicardipine were discontinued to allow for permissive hypertension. MRI brain 6/16/2024 revealed acute left frontal lobe infarct.    # Acute left frontal lobe infarct   suspect cardioembolic in etiology, but no known history of atrial fibrillation        -Continue DAPT for 30 days followed by ASA 325mg monotherapy   -Continue atorvastatin 80 mg daily  -TTE is unremarkable; JAMARI negative for cardioembolic source; recommend holter monitor vs. Loop prior to DC; cardiology consult placed  -Monitor telemetry for atrial fibrillation, order STAT EKG for any rhythm changes   -LDL 83, goal < 70 for secondary stroke prevention; patient has intolerant to statin therapy.  Will recommend Zetia 10 mg as outpatient.  -EEG: Negative for any seizure   -Repeat MRI shows no progression of stroke   -PT/OT recommend IPR; CM following for discharge needs  -SLP; diet per recommendations  -Neuro-checks per unit protocol while inpatient  -Blood pressure goal: Slowly normalize, overall management per ICU medicine team   -DVT prophylaxis: SCD,  enoxaparin  -Okay for telemetry from a neurology perspective if okay with primary team.  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 Baird, and primary team.  From a neurological standpoint the patient can be [facility] after being evaluated by cardiology and having Holter versus ILR placed.  Stroke neurology will continue to follow.  Please call with any questions or concerns.    Discussed the importance of medication compliance Plavix 75mg daily and Aspirin 81mg daily, and Zetia 10mg nightly and lifestyle modifications adequate control of blood pressure and adequate control of cholesterol (goal LDL <70) to help reduce the risk of future cerebrovascular events.  Also discussed the signs symptoms that would warrant the patient return back to the emergency department including unilateral weakness, unilateral numbness, visual disturbances, loss of balance, speech difficulties, and/or a sudden severe headache.  Patient verbalizes her understanding.  She has been encouraged to contact our office should she have any questions or concerns prior to follow-up appointment.    CONTRERAS Lubin, AGACNP-[facility] STROKE NEURO  06/20/24  08:08 EDT

## 2024-06-20 NOTE — PLAN OF CARE
Goal Outcome Evaluation:  Plan of Care Reviewed With: patient        Progress: no change  Outcome Evaluation: Pt waiting for transfer to tele, vitals stable on room air. No neuro changes overnight, pt tolerated ambulating around room, one small BM overnight.

## 2024-06-20 NOTE — DISCHARGE SUMMARY
DISCHARGE SUMMARY       Patient name: Aminah Calderon  CSN: [id number]  MRN: [id number]  : 1950    Date of Admission: 2024  Date of Discharge:  2024    Admitting Physician: Maikol Engel MD   Primary Care Provider: Yung Marie MD  Consultations:   Clau Hinojosa MD Neurology   Jayro Mitchell MD Cardiology     Admission Diagnosis: Acute CVA      Discharge Diagnoses:     Acute CVA (cerebrovascular accident)    Essential hypertension    Basilar artery aneurysm    Stroke    Procedures:  24: Transesophageal echocardiogram   24: Loop recorder implantation     Imaging:  MRI Brain Without Contrast    Result Date: 2024  Impression: 1.No significant progression in the area of infarction involving the territory of the left middle cerebral artery in the frontal area. 2.Minimal periventricular white matter changes which could reflect more chronic small vessel ischemic change. 3.There is some sphenoid sinus and left mastoid disease which has been noted Electronically Signed: Bubba Villegas MD  2024 7:50 AM EDT  Workstation ID: [id number]    EEG    Result Date: 2024  Focal cerebral dysfunction impacting primarily the left anterior hemisphere.  This is consistent with a known left hemispheric stroke No evidence for epilepsy is seen This report is transcribed using the Dragon dictation system.      MRI Brain Without Contrast    Result Date: 2024  Impression: 1.Acute/subacute infarct in the posterior left frontal lobe. 2.Minimal chronic small vessel ischemic change. 3.Small left mastoid effusion. Electronically Signed: Jose Ontiveros MD  2024 11:22 PM EDT  Workstation ID: [id number]    MRI Angiogram Venogram Head    Result Date: 2024  Impression: No evidence of dural venous sinus thrombosis. Electronically Signed: Jose Ontiveros MD  2024 10:55 PM EDT  Workstation ID: [id number]    CT venogram head    Result Date: 2024  0.4 cm aneurysm of the basilar  artery tip. Otherwise no vessel stenosis or occlusion is appreciated. 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 Otherwise no vessel stenosis or occlusion is appreciated. Electronically Signed: Yg Denise MD  6/16/2024 7:23 PM EDT  Workstation ID: [id number]    CT Head Without Contrast Stroke Protocol    Result Date: 6/16/2024  Impression: 1. No intracranial hemorrhage 2. No CT changes of acute vascular territory brain ischemia at this time Electronically Signed: Srinath Godfrey  6/16/2024 7:12 PM EDT  Workstation ID: [id number]     History of Present Illness (copied from H&P note on 6/16/24):  73-year-old female admitted to the intensive care unit on 6/16/2024 in transfer from an outside hospital for presumptive diagnosis of CVA     Patient has no history of prior neurologic event.  Past history of hypertension hyperlipidemia.  Last known well at 10 AM.  Woke up at around noon with right-sided weakness and slurred speech that improved transiently.  She went to her local hospital where she had an NIH stroke scale of 1.  She had hypertension treated with Cardene and subsequently had worsening NIH stroke scale that increased to 10.  Cardene was discontinued.  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     She was transferred to [facility] where on examination she has persistent dysarthria and right upper extremity weakness.  No past history of CVA or other neurologic deficit.     Head imaging on arrival to [facility] revealed no acute intracranial abnormality, no LVO or other abnormalities other than a 0.4 cm basilar artery tip aneurysm.     Patient occasionally takes Xanax for anxiety  Non-smoker and denies illicit drug use (end of copied text)    Hospital Course:  Patient  "was admitted to ICU 2* issues discussed in the above HPI.     She was evaluated by Neurology with further workup performed including MRI showing acute / subacute infarct in the posterior left frontal lobe, MRA/V negative for evidence of dural venous sinus thrombosis, and EEG which was negative for seizures. Heparin infusion was discontinued and patient was placed on DAPT in addition to high-intensity statin therapy. TTE showed LVEF 60% with grade III LV diastolic dysfunction and saline test results negative for PFO, with Cardiology consulted and subsequent JAMARI negative for intracardiac thrombus or definitive etiology of stroke. The decision was made to place an implantable loop recorder for long term atrial fibrillation monitoring (done 6/20/24) as no AF has been observed during hospitalization.     Patient is felt to have reached maximum benefit from this hospitalization and has been deemed appropriate for discharge. She is feeling well, is tolerating a PO diet, and is ambulating without significant difficulty. She has worked with PT & OT, both of whom have recommended continued inpatient rehabilitation upon discharge as she still has notable neurologic deficits. She was ultimately accepted by [facility] and will be transported today via family.     Vitals:  BP (!) 195/95 (BP Location: Left arm, Patient Position: Lying)   Pulse 71   Temp 97.8 °F (36.6 °C) (Oral)   Resp 20   Ht 160 cm (62.99\")   Wt 71 kg (156 lb 8.4 oz)   SpO2 98%   BMI 27.73 kg/m²     Physical Exam:  Constitutional:  Well-appearing elderly female sitting up in bed. No distress.   HEENT:  Normocephalic and atraumatic. PER  Neck: Normal range of motion. Neck supple. No JVD present.   Cardiovascular: Normal rate, regular rhythm, normal heart sounds and intact distal pulses.  No gallop and no friction rub.  No murmur heard.  Pulmonary/Chest: Effort normal and breath sounds normal. No respiratory distress. No wheezes, rhonchi or rales. "   Abdominal: Soft. No distension and no mass. There is no tenderness.   Musculoskeletal: Moves all extremities.   Neurological: Alert and oriented to person, place, and time. Mild right facial droop. Notable expressive aphasia and dysarthria.   Skin: Skin is warm and dry. No rash noted.   Extremities:  No clubbing, edema or cyanosis  Psychiatric: Normal mood and affect. Behavior is normal.     Interval:  (Return from MRI)  1a. Level of Consciousness: 0-->Alert, keenly responsive  1b. LOC Questions: 0-->Answers both questions correctly  1c. LOC Commands: 0-->Performs both tasks correctly  2. Best Gaze: 0-->Normal  3. Visual: 0-->No visual loss  4. Facial Palsy: 2-->Partial paralysis (total or near-total paralysis of lower face)  5a. Motor Arm, Left: 0-->No drift, limb holds 90 (or 45) degrees for full 10 secs  5b. Motor Arm, Right: 0-->No drift, limb holds 90 (or 45) degrees for full 10 secs  6a. Motor Leg, Left: 0-->No drift, leg holds 30 degree position for full 5 secs  6b. Motor Leg, Right: 0-->No drift, leg holds 30 degree position for full 5 secs  7. Limb Ataxia: 0-->Absent  8. Sensory: 0-->Normal, no sensory loss  9. Best Language: 1-->Mild-to-moderate aphasia, some obvious loss of fluency or facility of comprehension, without significant limitation on ideas expressed or form of expression. Reduction of speech and/or comprehension, however, makes conversation. . . (see row details)  10. Dysarthria: 2-->Severe dysarthria, patients speech is so slurred as to be unintelligible in the absence of or out of proportion to any dysphasia, or is mute/anarthric  11. Extinction and Inattention (formerly Neglect): 0-->No abnormality    Total (NIH Stroke Scale): 5    Labs:  Results from last 7 days   Lab Units 06/20/24  0402   WBC 10*3/mm3 8.58   HEMOGLOBIN g/dL 12.9   HEMATOCRIT % 39.6   PLATELETS 10*3/mm3 276     Results from last 7 days   Lab Units 06/20/24  0402   SODIUM mmol/L 139   POTASSIUM mmol/L 3.2*   CHLORIDE  mmol/L 103   CO2 mmol/L 25.0   BUN mg/dL 12   CREATININE mg/dL 0.57   CALCIUM mg/dL 8.5*   ALT (SGPT) U/L 11   AST (SGOT) U/L 12   GLUCOSE mg/dL 123*         Magnesium   Date Value Ref Range Status   06/18/2024 2.3 1.6 - 2.4 mg/dL Final     Comment:     Result checked           Discharge Medications:     Discharge Medications        New Medications        Instructions Start Date   aspirin 81 MG chewable tablet   81 mg, Oral, Daily   Start Date: June 21, 2024     aspirin 325 MG tablet  Commonly known as: Russel Aspirin   325 mg, Oral, Daily   Start Date: July 9, 2024     atorvastatin 80 MG tablet  Commonly known as: LIPITOR   80 mg, Oral, Nightly      bisoprolol 5 MG tablet  Commonly known as: ZEBeta   5 mg, Oral, Every 24 Hours Scheduled   Start Date: June 21, 2024     clopidogrel 75 MG tablet  Commonly known as: PLAVIX   75 mg, Oral, Daily, Stop medication on 7/8/2024   Start Date: June 21, 2024     ezetimibe 10 MG tablet  Commonly known as: Zetia   10 mg, Oral, Daily      fluticasone 50 MCG/ACT nasal spray  Commonly known as: FLONASE   2 sprays, Each Nare, Daily   Start Date: June 21, 2024     losartan 25 MG tablet  Commonly known as: COZAAR   25 mg, Oral, Every 24 Hours Scheduled   Start Date: June 21, 2024            Stop These Medications      Biotin 5000 MCG tablet     cephalexin 250 MG capsule  Commonly known as: KEFLEX     estrogens (conjugated) 1.25 MG tablet  Commonly known as: PREMARIN     metoprolol tartrate 50 MG tablet  Commonly known as: LOPRESSOR            Discharge Diet:   Diet Instructions       Diet: Regular/House Diet; Regular (IDDSI 7); Thin (IDDSI 0)      Discharge Diet: Regular/House Diet    Texture: Regular (IDDSI 7)    Fluid Consistency: Thin (IDDSI 0)          Activity at Discharge:    Activity Instructions       Activity as Tolerated            Follow-up Appointments  Future Appointments   Date Time Provider Department Center   6/27/2024  1:00 PM WOUND CHECK [facility]   7/30/2024   9:00 AM Laina Smith APRN [facility]   9/25/2024 11:30 AM Maikol Ortega MD [facility]     Additional Instructions for the Follow-ups that You Need to Schedule       Discharge Follow-up with PCP   As directed       Currently Documented PCP:    Yung Marie MD    PCP Phone Number:    [phone]     Follow Up Details: 1 week        Discharge Follow-up with Specialty: Neurology Stroke; 1 Month   As directed      Specialty: Neurology Stroke   Follow Up: 1 Month              Discharge Instructions:  Discharge to [facility] for continued rehabilitation   Medications as above; continue 81 mg ASA + Plavix until 7/8 (on 7/9 stop Plavix and increase ASA to 325 mg daily)    Follow up with providers as above     Current Code Status       Date Active Code Status Order ID Comments User Context       6/18/2024 1115 CPR (Attempt to Resuscitate) [id number]  John Marroquin MD Inpatient        Question Answer    Code Status (Patient has no pulse and is not breathing) CPR (Attempt to Resuscitate)    Medical Interventions (Patient has pulse or is breathing) Full Support    Level Of Support Discussed With Patient             Crista Khan DNP, APRN, AGACNP-BC  Pulmonary and Critical Care Medicine  06/20/24     Time: Discharge 40 min    CC: Yung Marie MD    Please note that portions of this note were completed with a voice recognition program.

## 2024-06-20 NOTE — THERAPY TREATMENT NOTE
Acute Care - Speech Language Pathology   Swallow Treatment Note  [place]     Patient Name: Aminah Calderon  : 1950  MRN: [id number]  Today's Date: 2024               Admit Date: 2024    Visit Dx:     ICD-10-CM ICD-9-CM   1. Oral phase dysphagia  R13.11 787.21   2. Aphasia  R47.01 784.3   3. Cerebrovascular accident (CVA) due to embolism of left middle cerebral artery  I63.412 434.11     Patient Active Problem List   Diagnosis    Acute CVA (cerebrovascular accident)    Essential hypertension    Basilar artery aneurysm    Stroke     History reviewed. No pertinent past medical history.  History reviewed. No pertinent surgical history.    SLP Recommendation and Plan     SLP Diet Recommendation: soft to chew textures, chopped, thin liquids, other (see comments) (24)  Recommended Precautions and Strategies: upright posture during/after eating, small bites of food and sips of liquid, general aspiration precautions (24)  SLP Rec. for Method of Medication Administration: meds whole, with puree, with thin liquids, as tolerated (24)     Monitor for Signs of Aspiration: yes, notify SLP if any concerns (24)        Anticipated Discharge Disposition (SLP): inpatient rehabilitation facility (24)     Therapy Frequency (Swallow): 5 days per week (24)  Predicted Duration Therapy Intervention (Days): 1 week (24)  Oral Care Recommendations: Oral Care BID/PRN, Toothbrush (24)        Daily Summary of Progress (SLP): progress toward functional goals is good (24)               Treatment Assessment (SLP): continued, dysarthria, oral dysphagia, communication disorder (24)  Treatment Assessment Comments (SLP): Patient presents with mod-severe dysarthria and ongoing oral dysphagia. patient presents with reduced labial seal, anterior loss, and poor bolus stripping from spoon. Completed labial strengthening exercises. no  overt s/sx of aspiration/penetration. Prolonged mastication of complex solids with mild pocketing. not yet appropriate to regular solids d/t prolonged mastication and pocketing. reviewed residue clearing strategies (i..e, lingual sweep, finger sweep, alternating liquids and solids). Reviewed dysarthria strategies and completed pacing exercises during multisyllable productions. (06/20/24 0918)  Plan for Continued Treatment (SLP): continue treatment per plan of care (06/20/24 0918)                SWALLOW EVALUATION (Last 72 Hours)       SLP Adult Swallow Evaluation       Row Name 06/20/24 0918                   Rehab Evaluation    Document Type therapy note (daily note)  -GA        Subjective Information no complaints  -GA        Patient Observations alert  -GA        Patient/Family/Caregiver Comments/Observations daughter present  -GA        Patient Effort good  -GA           General Information    Patient Profile Reviewed yes  -GA        Pertinent History Of Current Problem see initial evaluation  -GA        Current Method of Nutrition NPO  -GA        Prior Level of Function-Communication WFL  -GA        Prior Level of Function-Swallowing safe, efficient swallowing in all situations  -GA        Plans/Goals Discussed with patient and family;agreed upon  -GA        Barriers to Rehab none identified  -GA           Pain    Additional Documentation Pain Scale: FACES Pre/Post-Treatment (Group)  -GA           Pain Scale: Numbers Pre/Post-Treatment    Pretreatment Pain Rating 0/10 - no pain  -GA        Posttreatment Pain Rating 0/10 - no pain  -GA           SLP Treatment Clinical Impressions    Treatment Assessment (SLP) continued;dysarthria;oral dysphagia;communication disorder  -GA        Treatment Assessment Comments (SLP) Patient presents with mod-severe dysarthria and ongoing oral dysphagia. patient presents with reduced labial seal, anterior loss, and poor bolus stripping from spoon. Completed labial strengthening  exercises. no overt s/sx of aspiration/penetration. Prolonged mastication of complex solids with mild pocketing. not yet appropriate to regular solids d/t prolonged mastication and pocketing. reviewed residue clearing strategies (i..e, lingual sweep, finger sweep, alternating liquids and solids). Reviewed dysarthria strategies and completed pacing exercises during multisyllable productions.  -GA        Daily Summary of Progress (SLP) progress toward functional goals is good  -GA        Plan for Continued Treatment (SLP) continue treatment per plan of care  -GA        Care Plan Review care plan/treatment goals reviewed;patient/other agree to care plan  -GA           Recommendations    Therapy Frequency (Swallow) 5 days per week  -GA        Predicted Duration Therapy Intervention (Days) 1 week  -GA        SLP Diet Recommendation soft to chew textures;chopped;thin liquids;other (see comments)  -GA        Recommended Precautions and Strategies upright posture during/after eating;small bites of food and sips of liquid;general aspiration precautions  -GA        Oral Care Recommendations Oral Care BID/PRN;Toothbrush  -GA        SLP Rec. for Method of Medication Administration meds whole;with puree;with thin liquids;as tolerated  -GA        Monitor for Signs of Aspiration yes;notify SLP if any concerns  -GA        Anticipated Discharge Disposition (SLP) inpatient rehabilitation facility  -GA                  User Key  (r) = Recorded By, (t) = Taken By, (c) = Cosigned By      Initials Name Effective Dates    Sydney Menjivar MS CCC-SLP 09/14/23 -                     EDUCATION  The patient has been educated in the following areas:   Cognitive Impairment Communication Impairment Dysphagia (Swallowing Impairment) Modified Diet Instruction.        SLP GOALS       Row Name 06/20/24 0918             (LTG) Patient will demonstrate functional swallow for    Diet Texture (Demonstrate functional swallow) soft to chew (whole)  textures  -GA      Liquid viscosity (Demonstrate functional swallow) thin liquids  -GA      [place] (Demonstrate functional swallow) independently (over 90% accuracy)  -GA      Time Frame (Demonstrate functional swallow) 1 week  -GA      Progress/Outcomes (Demonstrate functional swallow) good progress toward goal  -GA      Comment (Demonstrate functional swallow) trialed regular solids but presents with prolonged mastication and pocketing. tolerating soft chopped solids.  -GA         (STG) Patient will tolerate trials of    Consistencies Trialed (Tolerate trials) soft to chew (chopped) textures;thin liquids  -GA      Desired Outcome (Tolerate trials) with adequate oral prep/transit/clearance;without signs/symptoms of aspiration  -GA      [place] (Tolerate trials) independently (over 90% accuracy)  -GA      Progress/Outcomes (Tolerate trials) good progress toward goal  -GA      Comment (Tolerate trials) tolerating without s/sx of asp/pen  -GA         (STG) Patient will tolerate therapeutic trials of    Consistencies Trialed (Tolerate therapeutic trials) soft to chew (whole) textures;regular textures  -GA      Desired Outcome (Tolerate therapeutic trials) with adequate oral prep/transit/clearance  -GA      [place] (Tolerate therapeutic trials) independently (over 90% accuracy)  -GA      Time Frame (Tolerate therapeutic trials) 1 week  -GA      Comment (Tolerate therapeutic trials) paitent tolerating trials without overt s/sx however prolonged masticaiton and pocketing  -GA         (STG) Labial Strengthening Goal 1 (SLP)    Activity (Labial Strengthening Goal 1, SLP) increase labial tone  -GA      Increase Labial Tone labial resistance exercises;swallow trials  -GA      [place]/Accuracy (Labial Strengthening Goal 1, SLP) independently (over 90% accuracy)  -GA      Time Frame (Labial Strengthening Goal 1, SLP) 1 week  -GA      Progress/Outcomes (Labial Strengthening Goal 1, SLP) continuing progress  toward goal  -GA      Comment (Labial Strengthening Goal 1, SLP) complete labial seal exercises with resistance x10  -GA         (STG) Lingual Strengthening Goal 1 (SLP)    Activity (Lingual Strengthening Goal 1, SLP) increase lingual tone/sensation/control/coordination/movement  -GA      Increase Lingual Tone/Sensation/Control/Coordination/Movement swallow trials;lingual resistance exercises  -GA      [place]/Accuracy (Lingual Strengthening Goal 1, SLP) independently (over 90% accuracy)  -GA      Time Frame (Lingual Strengthening Goal 1, SLP) 1 week  -GA      Progress/Outcomes (Lingual Strengthening Goal 1, SLP) goal ongoing  -GA      Comment (Lingual Strengthening Goal 1, SLP) .  -GA         (STG) Swallow Management Recall Goal 1 (SLP)    Activity (Swallow Management Recall Goal 1, SLP) aspiration precautions;safe diet/liquid level  -GA      [place]/Accuracy (Swallow Management Recall Goal 1, SLP) independently (over 90% accuracy)  -GA      Time Frame (Swallow Management Recall Goal 1, SLP) 1 week  -GA      Progress/Outcomes (Swallow Management Recall Goal 1, SLP) goal met  -GA         SLP Diagnostic Treatment     Patient will participate in further assessment in the following areas reading comprehension;graphic expression;motor speech;cognitive-linguistic  -GA      Time Frame (Diagnostic) 1 week  -GA      Progress/Outcomes (Additional Goal 1, SLP) goal ongoing  -GA         Word Retrieval Skills Goal 1 (SLP)    Improve Word Retrieval Skills By Goal 1 (SLP) completing automatic speech task, counting;simple;repeating sounds;repeating words;completing open ended structured sentence;answer WH question with one word;70%;with moderate cues (50-74%)  -GA      Time Frame (Word Retrieval Goal 1, SLP) 1 week  -GA      Progress/Outcomes (Word Retrieval Goal 1, SLP) continuing progress toward goal  -GA      Comment (Word Retrieval Goal 1, SLP) Completed repeating sounds and multisyllable producing while using  dysarthria strategies  -GA         Articulation Goal 1 (SLP)    Improve Articulation Goal 1 (SLP) by over-articulating in connected speech;90%;with minimal cues (75-90%)  -GA      Time Frame (Articulation Goal 1, SLP) 1 week  -GA      Progress/Outcomes (Articulation Goal 1, SLP) new goal  -GA         Patient Will Implement Strategies Goal 1 (SLP)    Implement Strategies of Goal 1 (SLP) using external rate control;90%;independently (over 90% accuracy)  -GA      Time Frame (Strategy Implementation Goal 1, SLP) 1 week  -GA      Progress/Outcomes (Strategy Implementation Goal 1, SLP) new goal  -GA                User Key  (r) = Recorded By, (t) = Taken By, (c) = Cosigned By      Initials Name Provider Type    Sydney Menjivar MS CCC-SLP Speech and Language Pathologist                         Time Calculation:    Time Calculation- SLP       Row Name 06/20/24 1123             Time Calculation- SLP    SLP Start Time 0918  -GA         Untimed Charges    70775-GW Treatment/ST Modification Prosth Aug Alter  40  -GA      34021-SR Treatment Swallow Minutes 40  -GA         Total Minutes    Untimed Charges Total Minutes 80  -GA       Total Minutes 80  -GA                User Key  (r) = Recorded By, (t) = Taken By, (c) = Cosigned By      Initials Name Provider Type    Sydney Menjivar MS CCC-SLP Speech and Language Pathologist                    Therapy Charges for Today       Code Description Service Date Service Provider Modifiers Qty    76833642101 HC ST TREATMENT SWALLOW 3 6/20/2024 Sydney Garces MS CCC-SLP GN 1    47777019463 HC ST TREATMENT SPEECH 3 6/20/2024 Sydney Garces MS CCC-OCTAVIO GN 1                 MS ALEX Epstein  6/20/2024

## 2024-06-20 NOTE — CASE MANAGEMENT/SOCIAL WORK
Case Management Discharge Note      Final Note: Patient has insurance approval for acute rehab at [facility] and she is in agreement with discharge plan.  Family will transport to rehab.  Nurse to call report to the stroke unit at [phone].  CM will fax DC summary to [phone].         Selected Continued Care - Admitted Since 6/16/2024       Destination Coordination complete.      Service Provider Selected Services Address Phone Fax Patient Preferred    [facility] [address] [id number] [phone] --              Durable Medical Equipment    No services have been selected for the patient.                Dialysis/Infusion    No services have been selected for the patient.                Home Medical Care    No services have been selected for the patient.                Therapy    No services have been selected for the patient.                Community Resources    No services have been selected for the patient.                Community & DME    No services have been selected for the patient.                         Final Discharge Disposition Code: 62 - inpatient rehab facility

## 2024-06-23 LAB
BACTERIA SPEC AEROBE CULT: NORMAL
BACTERIA SPEC AEROBE CULT: NORMAL

## 2024-06-27 ENCOUNTER — OFFICE VISIT (OUTPATIENT)
Dept: CARDIOLOGY | Facility: CLINIC | Age: 74
End: 2024-06-27
Payer: MEDICARE

## 2024-06-27 DIAGNOSIS — I72.5 BASILAR ARTERY ANEURYSM: Primary | ICD-10-CM

## 2024-06-27 NOTE — PROGRESS NOTES
2024    Aminah Calderon, : 1950      Fever: No    Temperature if indicated: 98.2    Wound Location: Xiphoid Process    Dressing Removed: Removed by MA/RN      Old Dressing Appearance:  Old, bloody drainage    Wound Appearance: Redness []                  Drainage []                  Culture obtained []        Color: Clear     Consistency:  na     Amount: none         Gloves used, wound cleansed with sterile 4x4 and peroxide [x]       MD notified [x]     MD orders:     Antibiotic started []      If checked, type     Other: Dr. Ortega examined pt's wound since it is still open, replace clear window dressing with nonstick pad.       Appointment for follow-up scheduled for 3 months post procedure [x]    Future Appointments   Date Time Provider Department Center   2024  9:00 AM Laina Smith APRN [facility]   2024 11:30 AM Maikol Ortega MD [facility]           April ABIMBOLA Hawkins, 24      MD Signature:______________________________ Completed By/Date:

## 2024-07-30 ENCOUNTER — OFFICE VISIT (OUTPATIENT)
Dept: NEUROLOGY | Facility: CLINIC | Age: 74
End: 2024-07-30
Payer: MEDICARE

## 2024-07-30 VITALS
HEART RATE: 57 BPM | RESPIRATION RATE: 16 BRPM | DIASTOLIC BLOOD PRESSURE: 62 MMHG | WEIGHT: 156 LBS | BODY MASS INDEX: 27.64 KG/M2 | HEIGHT: 63 IN | OXYGEN SATURATION: 95 % | SYSTOLIC BLOOD PRESSURE: 114 MMHG

## 2024-07-30 DIAGNOSIS — I72.5 BASILAR ARTERY ANEURYSM: Primary | ICD-10-CM

## 2024-07-30 RX ORDER — LOSARTAN POTASSIUM 100 MG/1
1 TABLET ORAL DAILY
COMMUNITY
Start: 2024-07-24

## 2024-07-30 RX ORDER — LISINOPRIL 10 MG/1
10 TABLET ORAL 2 TIMES DAILY
COMMUNITY
End: 2024-07-30 | Stop reason: ALTCHOICE

## 2024-07-30 RX ORDER — ESCITALOPRAM OXALATE 10 MG/1
10 TABLET ORAL DAILY
COMMUNITY

## 2024-07-30 RX ORDER — AMLODIPINE BESYLATE 5 MG/1
5 TABLET ORAL DAILY
COMMUNITY

## 2024-07-30 RX ORDER — MAGNESIUM OXIDE 400 MG/1
400 TABLET ORAL DAILY
COMMUNITY

## 2024-07-30 RX ORDER — PANTOPRAZOLE SODIUM 40 MG/1
40 TABLET, DELAYED RELEASE ORAL DAILY
COMMUNITY

## 2024-07-30 RX ORDER — ASPIRIN 325 MG
325 TABLET ORAL DAILY
Qty: 30 TABLET | Refills: 11 | Status: SHIPPED | OUTPATIENT
Start: 2024-07-30 | End: 2025-07-30

## 2024-07-30 NOTE — PATIENT INSTRUCTIONS
-stop aspirin 81 mg   -stop Plavix 75 mg   -start aspirin 325 mg daily   -patient cannot tolerate statins or zetia  -recheck cholesterol with PCP in August and if number go up at all then may need to consider other cholesterol medications such as Repatha  -normal BP goals, <130/80   -ED for any new stroke like symptoms

## 2024-07-30 NOTE — PROGRESS NOTES
New Patient Office Visit      Encounter Date: 2024   Patient Name: Aminah Calderon  : 1950   MRN: [id number]   PCP: Yung Marie MD    Referring Provider: Jaimee Landry*     Chief Complaint:    Chief Complaint   Patient presents with    Follow-up    Stroke       History of Present Illness: Aminah Tellez is a 73-year-old female former smoker PMH HTN, HLD, GERD, anxiety, depression who presented to [facility] for acute onset dysarthria and right hand weakness.  CT head 2024 without hemorrhage.  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  TNK was deferred by [facility] due to aneurysm.  CT angiogram head and neck at outside hospital mentioned possible left transverse and sigmoid sinus thromboses.  Patient was started on heparin drip and nicardipine started for SBP <180 while on heparin drip.  Patient was transferred to [facility] for higher level of care.  We repeated CT angiogram head and neck, CT venogram, and MR venogram which did not reveal sinus thromboses thus heparin drip and nicardipine were discontinued to allow for permissive hypertension. MRI brain 2024 revealed acute left frontal lobe infarct. Patient was discharged home on DAPT x 30 days followed by  mg.     Clinic visit 2024: Patient presents to clinic today to establish care following her recent stroke in .  She reports that she is doing well and feels that she is making improvements.  She does continue to have some mild dysarthria and dysphagia.  She is working with speech therapy twice a week.  She shares with me that she cannot take any cholesterol medicines because they make her very weak and have leg cramps.  She has tried multiple statins and Zetia unsuccessfully.  We discussed some lifestyle modifications and that she should be monitored closely to ensure that her LDL is at least maintaining at its current level of 83.  Should her numbers increase she needs  to explore other options like Repatha.    Stroke Risk Factors: hyperlipidemia, hypertension, and smoking      Subjective      Review of Systems:   Review of Systems   Constitutional:  Positive for activity change and fatigue. Negative for appetite change, chills, diaphoresis, fever, unexpected weight gain and unexpected weight loss.   HENT:  Positive for trouble swallowing.    Eyes:  Negative for blurred vision, double vision, photophobia and visual disturbance.   Respiratory:  Negative for cough and shortness of breath.    Gastrointestinal:  Negative for blood in stool, nausea and vomiting.   Genitourinary:  Negative for hematuria.   Musculoskeletal:  Negative for gait problem.   Neurological:  Positive for speech difficulty. Negative for dizziness, tremors, seizures, syncope, facial asymmetry, weakness, light-headedness, numbness, headache, memory problem and confusion.       Past Medical History:   Past Medical History:   Diagnosis Date    Stroke        Past Surgical History: History reviewed. No pertinent surgical history.    Family History:   Family History   Problem Relation Age of Onset    Colon cancer Father     Heart attack Father     Colon cancer Sister     Colon cancer Paternal Aunt     Heart attack Paternal Aunt     Heart attack Paternal Uncle     Stroke Maternal Grandmother     Heart attack Paternal Grandfather        Social History:   Social History     Socioeconomic History    Marital status:    Tobacco Use    Smoking status: Former     Current packs/day: 0.00     Types: Cigarettes     Quit date:      Years since quittin.6     Passive exposure: Past    Smokeless tobacco: Never   Vaping Use    Vaping status: Never Used   Substance and Sexual Activity    Alcohol use: Not Currently    Drug use: Never       Medications:     Current Outpatient Medications:     Acetaminophen (TYLENOL ARTHRITIS PAIN PO), Take  by mouth. Pt states she takes 2 in morning, 1 at lunch, 2 at bedtime, Disp: , Rfl:  "    amLODIPine (NORVASC) 5 MG tablet, Take 1 tablet by mouth Daily., Disp: , Rfl:     aspirin (Russel Aspirin) 325 MG tablet, Take 1 tablet by mouth Daily., Disp: 30 tablet, Rfl: 11    escitalopram (LEXAPRO) 10 MG tablet, Take 1 tablet by mouth Daily., Disp: , Rfl:     losartan (COZAAR) 100 MG tablet, Take 1 tablet by mouth Daily., Disp: , Rfl:     magnesium oxide (MAG-OX) 400 MG tablet, Take 1 tablet by mouth Daily., Disp: , Rfl:     pantoprazole (PROTONIX) 40 MG EC tablet, Take 1 tablet by mouth Daily., Disp: , Rfl:     bisoprolol (ZEBeta) 5 MG tablet, Take 1 tablet by mouth Daily. (Patient not taking: Reported on 7/30/2024), Disp: , Rfl:     fluticasone (FLONASE) 50 MCG/ACT nasal spray, 2 sprays by Each Nare route Daily. (Patient not taking: Reported on 7/30/2024), Disp: , Rfl:     Allergies:   Allergies   Allergen Reactions    Statins Myalgia       Objective     Physical Exam:  Vital Signs:   Vitals:    07/30/24 0846   BP: 114/62   Pulse: 57   Resp: 16   SpO2: 95%   Weight: 70.8 kg (156 lb)   Height: 160 cm (62.99\")     Body mass index is 27.64 kg/m².     Physical Exam  Vitals and nursing note reviewed.   Constitutional:       General: She is awake. She is not in acute distress.     Appearance: Normal appearance. She is normal weight. She is not ill-appearing.      Comments: 73 year old  female    HENT:      Head: Normocephalic and atraumatic.      Nose: Nose normal.      Mouth/Throat:      Mouth: Mucous membranes are moist.   Eyes:      General: Lids are normal.      Extraocular Movements: Extraocular movements intact.      Pupils: Pupils are equal, round, and reactive to light.   Cardiovascular:      Rate and Rhythm: Normal rate and regular rhythm.      Pulses: Normal pulses.   Pulmonary:      Effort: Pulmonary effort is normal. No respiratory distress.   Skin:     General: Skin is warm and dry.   Neurological:      Mental Status: She is alert and oriented to person, place, and time.      Cranial " Nerves: Cranial nerve deficit and dysarthria present.      Motor: Motor strength is normal.     Coordination: Coordination is intact.   Psychiatric:         Mood and Affect: Mood normal.         Behavior: Behavior normal.       Neurological Exam  Mental Status  Awake and alert. Oriented to person, place, time and situation. Oriented to person, place, and time. Mild dysarthria present. Language is fluent with no aphasia. Attention and concentration are normal. Fund of knowledge is appropriate for level of education.    Cranial Nerves  CN II: Right visual acuity: Counts fingers. Left visual acuity: Counts fingers. Visual fields full to confrontation.  CN III, IV, VI: Extraocular movements intact bilaterally. Normal lids and orbits bilaterally. Pupils equal round and reactive to light bilaterally.  CN V: Facial sensation is normal.  CN VII:  Right: There is central facial weakness. Subtle.  CN VIII: Hearing is normal to speech .  CN XI: Shoulder shrug strength is normal.  CN XII: Tongue midline without atrophy or fasciculations.    Motor  Normal muscle bulk throughout. No fasciculations present. Normal muscle tone. Strength is 5/5 throughout all four extremities.    Sensory  Light touch is normal in upper and lower extremities. Pinprick is normal in upper and lower extremities.     Coordination    Finger-to-nose, rapid alternating movements and heel-to-shin normal bilaterally without dysmetria.  No obvious dysmetria .    Gait  Casual gait is normal including stance, stride, and arm swing.       Modified Rosanne Score: 2        0  No Symptoms    1 No significant disability. Able to carry out all usual activities, despite some symptoms.    2 Slight disability. Able to look after own affairs without assistance, but unable to carry out all previous activities.    3 Moderate disability. Requires some help, but able to walk unassisted.    4 Moderately severe disability. Unable to attend to own bodily needs without assistance,  and unable to walk unassisted.    5 Severe disability. Requires constant nursing care and attention, bedridden, incontinent.    6 Dead       PHQ-9 Depression Screening  Little interest or pleasure in doing things? 0-->not at all   Feeling down, depressed, or hopeless? 0-->not at all   Trouble falling or staying asleep, or sleeping too much?     Feeling tired or having little energy?     Poor appetite or overeating?     Feeling bad about yourself - or that you are a failure or have let yourself or your family down?     Trouble concentrating on things, such as reading the newspaper or watching television?     Moving or speaking so slowly that other people could have noticed? Or the opposite - being so fidgety or restless that you have been moving around a lot more than usual?     Thoughts that you would be better off dead, or of hurting yourself in some way?     PHQ-9 Total Score 0   If you checked off any problems, how difficult have these problems made it for you to do your work, take care of things at home, or get along with other people?            STOP-Bang Score  Have you been diagnosed with Sleep Apnea?: no (pt has a test on 9/4/24)       [facility] Fall Risk Clinician Key Questions   Have you fallen in the past year?: No  Do you feel unsteady with walking?: No (just a little weak)  Are you worried about falling?: No      Imaging Reviewed:   MRI Brain Without Contrast    Result Date: 6/18/2024  Impression: 1.No significant progression in the area of infarction involving the territory of the left middle cerebral artery in the frontal area. 2.Minimal periventricular white matter changes which could reflect more chronic small vessel ischemic change. 3.There is some sphenoid sinus and left mastoid disease which has been noted Electronically Signed: Bubba Villegas MD  6/18/2024 7:50 AM EDT  Workstation ID: [id number]    EEG    Result Date: 6/17/2024  Focal cerebral dysfunction impacting primarily the left anterior  hemisphere.  This is consistent with a known left hemispheric stroke No evidence for epilepsy is seen This report is transcribed using the Dragon dictation system.      MRI Brain Without Contrast    Result Date: 6/16/2024  Impression: 1.Acute/subacute infarct in the posterior left frontal lobe. 2.Minimal chronic small vessel ischemic change. 3.Small left mastoid effusion. Electronically Signed: Jose Ontiveros MD  6/16/2024 11:22 PM EDT  Workstation ID: [id number]    MRI Angiogram Venogram Head    Result Date: 6/16/2024  Impression: No evidence of dural venous sinus thrombosis. Electronically Signed: Jose Ontiveros MD  6/16/2024 10:55 PM EDT  Workstation ID: [id number]    CT venogram head    Result Date: 6/16/2024  0.4 cm aneurysm of the basilar artery tip. Otherwise no vessel stenosis or occlusion is appreciated. Electronically Signed: Yg Denise MD  6/16/2024 7:23 PM EDT  Workstation ID: [id number]    CT Angiogram Head w AI Analysis of LVO    Result Date: 6/16/2024  0.4 cm aneurysm of the basilar artery tip. Otherwise no vessel stenosis or occlusion is appreciated. Electronically Signed: Yg Denise MD  6/16/2024 7:23 PM EDT  Workstation ID: [id number]    CT Head Without Contrast Stroke Protocol    Result Date: 6/16/2024  Impression: 1. No intracranial hemorrhage 2. No CT changes of acute vascular territory brain ischemia at this time Electronically Signed: Srinath Godfrey  6/16/2024 7:12 PM EDT  Workstation ID: [id number]      Laboratory Results:   Lab Results   Component Value Date    GLUCOSE 123 (H) 06/20/2024    BUN 12 06/20/2024    CREATININE 0.57 06/20/2024    EGFR 96.1 06/20/2024    BCR 21.1 06/20/2024    K 3.2 (L) 06/20/2024    CO2 25.0 06/20/2024    CALCIUM 8.5 (L) 06/20/2024    AST 12 06/20/2024    ALT 11 06/20/2024     Lipid Panel          6/17/2024    05:26   Lipid Panel   Total Cholesterol 185    Triglycerides 211    HDL Cholesterol 67    VLDL Cholesterol 35    LDL Cholesterol  83    LDL/HDL Ratio 1.13        Most Recent A1C          6/17/2024    05:26   HGBA1C Most Recent   Hemoglobin A1C 5.50            Assessment / Plan      Assessment/Plan:   # Acute left frontal lobe infarct   -suspect cardioembolic.  No known history of atrial fibrillation  -Completed DAPT x 30 day, discontinue both ASA 81 and Plavix   -start  mg daily   -Patient has tried multiple statin medications as well as Zetia which all have caused side effects.  Her LDL is 83.  I have encouraged her to work on diet and exercise and have requested her PCP check her lipid panel at their next appointment.  If her cholesterol increases at all I would recommend Repatha otherwise she may be able to manage with lifestyle changes.  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  Also discussed the signs symptoms that would warrant the patient return back to the emergency department including unilateral weakness, unilateral numbness, visual disturbances, loss of balance, speech difficulties, and/or a sudden severe headache.      Follow Up:   Return in about 3 months (around 10/30/2024).    CONTRERAS Shaffer  [facility] [place] [facility]

## 2024-09-03 ENCOUNTER — TELEPHONE (OUTPATIENT)
Dept: CARDIOLOGY | Facility: CLINIC | Age: 74
End: 2024-09-03
Payer: MEDICARE

## 2024-09-03 NOTE — TELEPHONE ENCOUNTER
SW PT, ADV  ONLY SEE'S PATIENTS IN CLINIC ON WEDNESDAY'S. PT VERBALIZED UNDERSTANDING, SHE IS KEEPING ORIGINAL APPOINTMENT

## 2024-09-03 NOTE — TELEPHONE ENCOUNTER
Name: Aminah Calderon    Relationship: Self    Best Callback Number: [phone]    Incoming call to the Hub, requesting to  Reschedule their Other: MDT-LOOP 3 MO HFU    appointment on 09/25/24.     Per [facility] workflow, further review is needed before the task can be completed.    Result of Call: Please reach out to the patient to reschedule    PT IS WANTING TO POSSIBLY HAVE APPOINTMENT ON 10/03/24 IN THE EVENING. SHE HAS APPOINTMENTS THAT DAY IN [place], IT WOULD EASIER FOR HER SINCE SHE LIVES 2 HOURS AWAY

## 2024-09-13 ENCOUNTER — TELEPHONE (OUTPATIENT)
Dept: CARDIOLOGY | Facility: CLINIC | Age: 74
End: 2024-09-13
Payer: MEDICARE

## 2024-09-13 DIAGNOSIS — R60.0 EDEMA LEG: Primary | ICD-10-CM

## 2024-09-13 RX ORDER — FUROSEMIDE 20 MG
20 TABLET ORAL DAILY
Qty: 90 TABLET | Refills: 0 | Status: SHIPPED | OUTPATIENT
Start: 2024-09-13

## 2024-09-13 NOTE — TELEPHONE ENCOUNTER
"Pt c/o bilateral, nonpitting, feet and leg swelling. Started about 2 days ago, reports \"a little bit short of breath\". Swelling was worse yesterday, and she thought swelling would be down this morning after keeping legs propped up while sleeping, but reports no improvement. She denies pain, but says theres' tingling in the bottom of her feet. She says she's had swelling before when she went into the hospital in June for CVA. Does not monitor daily weights. She is taking amlodipine 5 mg, but she started this in July. She is compliant with all other cardiac medications in Epic, BP usually runs around 120's/40?, this morning BP was 157/80 HR 66. Has an appt with NO[facility] on 9/25. Last BMP 6/20/24 BUN 12, creatinine 0.57, , K 3.2, GFR 96.1, please advise.     "

## 2024-09-13 NOTE — TELEPHONE ENCOUNTER
Advised pt of JAL recommendations. Advised I will mail lab order out so she can get it done at PCP next week. Pt verbalized understanding and agreeable to plan

## 2024-09-20 PROCEDURE — 93298 REM INTERROG DEV EVAL SCRMS: CPT | Performed by: INTERNAL MEDICINE

## 2024-09-25 ENCOUNTER — OFFICE VISIT (OUTPATIENT)
Dept: CARDIOLOGY | Facility: CLINIC | Age: 74
End: 2024-09-25
Payer: MEDICARE

## 2024-09-25 VITALS
OXYGEN SATURATION: 98 % | BODY MASS INDEX: 26.29 KG/M2 | WEIGHT: 148.4 LBS | HEIGHT: 63 IN | DIASTOLIC BLOOD PRESSURE: 60 MMHG | HEART RATE: 70 BPM | SYSTOLIC BLOOD PRESSURE: 110 MMHG

## 2024-09-25 DIAGNOSIS — I72.5 BASILAR ARTERY ANEURYSM: ICD-10-CM

## 2024-09-25 DIAGNOSIS — I10 ESSENTIAL HYPERTENSION: ICD-10-CM

## 2024-09-25 DIAGNOSIS — I63.9 ACUTE CVA (CEREBROVASCULAR ACCIDENT): Primary | ICD-10-CM

## 2024-09-25 RX ORDER — TRAZODONE HYDROCHLORIDE 50 MG/1
50-100 TABLET, FILM COATED ORAL
COMMUNITY
Start: 2024-09-05

## 2024-09-25 RX ORDER — ZOLPIDEM TARTRATE 5 MG/1
TABLET ORAL
COMMUNITY
Start: 2024-09-04

## 2024-09-25 RX ORDER — POTASSIUM CHLORIDE 1500 MG/1
20 TABLET, EXTENDED RELEASE ORAL DAILY
Qty: 90 TABLET | Refills: 3 | Status: SHIPPED | OUTPATIENT
Start: 2024-09-25

## 2024-09-25 RX ORDER — EVOLOCUMAB 140 MG/ML
INJECTION, SOLUTION SUBCUTANEOUS
COMMUNITY
Start: 2024-09-03

## 2024-09-25 RX ORDER — VALSARTAN AND HYDROCHLOROTHIAZIDE 320; 25 MG/1; MG/1
1 TABLET, FILM COATED ORAL DAILY
COMMUNITY
Start: 2024-06-04

## 2024-10-03 ENCOUNTER — HOSPITAL ENCOUNTER (OUTPATIENT)
Dept: CT IMAGING | Facility: HOSPITAL | Age: 74
Discharge: HOME | End: 2024-10-03
Payer: MEDICARE

## 2024-10-03 DIAGNOSIS — I72.5 BASILAR ARTERY ANEURYSM: ICD-10-CM

## 2024-10-03 PROCEDURE — 70496 CT ANGIOGRAPHY HEAD: CPT

## 2024-10-03 PROCEDURE — 70498 CT ANGIOGRAPHY NECK: CPT

## 2024-10-03 PROCEDURE — 25510000001 IOPAMIDOL 61 % SOLUTION: Performed by: NURSE PRACTITIONER

## 2024-10-03 RX ORDER — IOPAMIDOL 612 MG/ML
100 INJECTION, SOLUTION INTRAVASCULAR
Status: COMPLETED | OUTPATIENT
Start: 2024-10-03 | End: 2024-10-03

## 2024-10-03 RX ADMIN — IOPAMIDOL 100 ML: 612 INJECTION, SOLUTION INTRAVENOUS at 10:45

## 2024-10-29 ENCOUNTER — TELEPHONE (OUTPATIENT)
Dept: NEUROLOGY | Facility: CLINIC | Age: 74
End: 2024-10-29
Payer: MEDICARE

## 2024-10-29 PROBLEM — E78.2 MIXED HYPERLIPIDEMIA: Status: ACTIVE | Noted: 2024-10-29

## 2024-10-29 NOTE — TELEPHONE ENCOUNTER
Provider: =USAMA KAUFMAN    Caller: PATIENT    Relationship to Patient: SELF    Phone Number: [phone]    Reason for Call: PT HAS AN APPT THIS FRIDAY, 11/1/24, AND SHE IS WANTING TO KNOW IF SHE STILL NEEDS TO KEEP THIS APPT AS SHE SAW THE RESULTS OF HER CT SCANS IN HER MYCHART AND THEY WERE NL.  SHE IS CHECKING BECAUSE SHE LIVES 2 HOURS AWAY.      PLEASE CALL PT TO ADVISE     THANK YOU

## 2024-10-30 NOTE — PROGRESS NOTES
Follow Up Office Visit      Encounter Date: 2024   Patient Name: Aminah Calderon  : 1950   MRN: [id number]   PCP: Yung Marie MD    Referring Provider: No ref. provider found     Chief Complaint:    Chief Complaint   Patient presents with    Follow-up       History of Present Illness:   History of Present Illness: Aminah Tellez is a 73-year-old female former smoker PMH HTN, HLD, GERD, anxiety, depression who presented to [facility] for acute onset dysarthria and right hand weakness.  CT head 2024 without hemorrhage.  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  TNK was deferred by [facility] due to aneurysm.  CT angiogram head and neck at outside hospital mentioned possible left transverse and sigmoid sinus thromboses.  Patient was started on heparin drip and nicardipine started for SBP <180 while on heparin drip.  Patient was transferred to [facility] for higher level of care.  We repeated CT angiogram head and neck, CT venogram, and MR venogram which did not reveal sinus thromboses thus heparin drip and nicardipine were discontinued to allow for permissive hypertension. MRI brain 2024 revealed acute left frontal lobe infarct. Patient was discharged home on DAPT x 30 days followed by  mg.      Clinic visit 2024: Patient presents to clinic today to establish care following her recent stroke in .  She reports that she is doing well and feels that she is making improvements.  She does continue to have some mild dysarthria and dysphagia.  She is working with speech therapy twice a week.  She shares with me that she cannot take any cholesterol medicines because they make her very weak and have leg cramps.  She has tried multiple statins and Zetia unsuccessfully.  We discussed some lifestyle modifications and that she should be monitored closely to ensure that her LDL is at least maintaining at its current level of 83.  Should her numbers  increase she needs to explore other options like Repatha.    Clinic visit 11/1/2024: She is seen in the clinic today with her  and her son. CTA head and neck repeated 10/3/2024 without any aneurysm growth. She has continued dysphagia and dysarthria. She is completed visits with SL, unsure if this is secondary to insurance allowance. She has continued significant SLP needs. Discussed and encouraged continuation of exercises independently. Encouraged her to read aloud as well. Since last visit she has been initiated on Repatha by Cardiology. She reports that she is tolerating all of her medications well. She does report that she is out of Flonase and has noticed and increase in drainage which has mildly increased her swallowing difficulties at times.    Subjective        I have reviewed and the following portions of the patient's history were updated as appropriate: past family history, past medical history, past social history, past surgical history and problem list.    Review of Systems   HENT:  Positive for congestion, trouble swallowing and voice change. Negative for drooling.    Respiratory:  Positive for cough.    Musculoskeletal:  Negative for gait problem.   Neurological:  Positive for speech difficulty. Negative for dizziness, facial asymmetry, weakness, light-headedness and headaches.     Medications:     Current Outpatient Medications:     Acetaminophen (TYLENOL ARTHRITIS PAIN PO), Take  by mouth. Pt states she takes 2 in morning, 1 at lunch, 2 at bedtime, Disp: , Rfl:     amLODIPine (NORVASC) 5 MG tablet, Take 1 tablet by mouth Daily., Disp: , Rfl:     aspirin (Russel Aspirin) 325 MG tablet, Take 1 tablet by mouth Daily., Disp: 30 tablet, Rfl: 11    carvedilol (COREG) 6.25 MG tablet, Take 1 tablet by mouth 2 (Two) Times a Day With Meals., Disp: , Rfl:     escitalopram (LEXAPRO) 10 MG tablet, Take 1 tablet by mouth Daily., Disp: , Rfl:     fluticasone (FLONASE) 50 MCG/ACT nasal spray, 2 sprays by Each  "Nare route Daily., Disp: , Rfl:     furosemide (LASIX) 20 MG tablet, Take 1 tablet by mouth Daily., Disp: 90 tablet, Rfl: 0    losartan (COZAAR) 100 MG tablet, Take 1 tablet by mouth Daily., Disp: , Rfl:     magnesium oxide (MAG-OX) 400 MG tablet, Take 1 tablet by mouth Daily., Disp: , Rfl:     pantoprazole (PROTONIX) 40 MG EC tablet, Take 1 tablet by mouth Daily., Disp: , Rfl:     potassium chloride (KLOR-CON M20) 20 MEQ CR tablet, Take 1 tablet by mouth Daily., Disp: 90 tablet, Rfl: 3    Repatha SureClick solution auto-injector SureClick injection, INJECT 1ml UNDER THE SKIN every 2 WEEKS, Disp: , Rfl:     vitamin D3 125 MCG (5000 UT) capsule capsule, Take 1 capsule by mouth Daily., Disp: , Rfl:     bisoprolol (ZEBeta) 5 MG tablet, Take 1 tablet by mouth Daily. (Patient not taking: Reported on 11/1/2024), Disp: , Rfl:     traZODone (DESYREL) 50 MG tablet, Take 1-2 tablets by mouth every night at bedtime. (Patient not taking: Reported on 11/1/2024), Disp: , Rfl:     valsartan-hydrochlorothiazide (DIOVAN-HCT) 320-25 MG per tablet, Take 1 tablet by mouth Daily. (Patient not taking: Reported on 11/1/2024), Disp: , Rfl:     zolpidem (AMBIEN) 5 MG tablet, Have not started yet, Disp: , Rfl:     Allergies:   Allergies   Allergen Reactions    Statins Myalgia       Objective     Physical Exam:   Vital Signs:   Vitals:    11/01/24 1130   BP: 124/66   Pulse: 65   Temp: 97.7 °F (36.5 °C)   SpO2: 97%   Weight: 66.2 kg (146 lb)   Height: 160 cm (62.99\")     Body mass index is 25.87 kg/m².    Physical Exam  Constitutional:       General: She is not in acute distress.     Appearance: Normal appearance.   HENT:      Head: Normocephalic.      Mouth/Throat:      Pharynx: Oropharynx is clear.   Eyes:      Extraocular Movements: Extraocular movements intact.      Pupils: Pupils are equal, round, and reactive to light.   Cardiovascular:      Rate and Rhythm: Normal rate.   Pulmonary:      Effort: Pulmonary effort is normal.      Breath " sounds: Normal breath sounds.   Neurological:      Mental Status: She is oriented to person, place, and time.      Cranial Nerves: Cranial nerve deficit and dysarthria present.      Sensory: No sensory deficit.      Motor: Motor strength is normal.No weakness.      Gait: Gait normal.   Psychiatric:         Mood and Affect: Mood normal.         Behavior: Behavior normal.         Thought Content: Thought content normal.         Judgment: Judgment normal.       Neurological Exam  Mental Status  Awake, alert and oriented to person, place and time. Oriented to person, place, time and situation. Oriented to person, place, and time. Mild dysarthria present. Language is fluent with no aphasia. Fund of knowledge is appropriate for level of education.    Cranial Nerves  CN II: Visual fields full to confrontation.  CN III, IV, VI: Extraocular movements intact bilaterally. Pupils equal round and reactive to light bilaterally.  CN V:  Right: Facial sensation is normal.  Left: Facial sensation is normal on the left.  CN VII:  Right: There is no facial weakness.  Left: There is no facial weakness.  CN VIII: Hearing is intact bilaterally .    Motor  Normal muscle bulk throughout. No fasciculations present. Normal muscle tone. Strength is 5/5 throughout all four extremities.    Sensory  Light touch is normal in upper and lower extremities.     Coordination  Right: Finger-to-nose normal. Rapid alternating movement normal.Left: Finger-to-nose normal. Rapid alternating movement normal.    Gait   Normal gait.Casual gait is normal including stance, stride, and arm swing.    NIH Stroke Scale    1a  Level of consciousness: 0=alert; keenly responsive   1b. LOC questions:  0=Answers both questions correctly    1c. LOC commands: 0=Performs both tasks correctly   2.  Best Gaze: 0=normal   3. Visual: 0=No visual loss   4. Facial Palsy: 0=Normal symmetric movement   5a. Motor left arm: 0=No drift, limb holds 90 (or 45) degrees for full 10  seconds   5b.  Motor right arm: 0=No drift, limb holds 90 (or 45) degrees for full 10 seconds   6a. Motor left le=No drift, limb holds 90 (or 45) degrees for full 10 seconds   6b  Motor right le=No drift, limb holds 90 (or 45) degrees for full 10 seconds   7. Limb Ataxia: 0=Absent   8.  Sensory: 0=Normal; no sensory loss   9. Best Language:  0=No aphasia, normal   10. Dysarthria: 1=Mild to moderate, patient slurs at least some words and at worst, can be understood with some difficulty   11. Extinction and Inattention: 0=No abnormality    Total:   1        Modified Rosanne Score     MODIFIED ROSANNE SCALE (to be assessed for each patient having history of stroke) []Stroke history but not assessed  []0: No symptoms at all  [x]1: No significant disability despite symptoms  []2: Slight disability  []3: Moderate disability  []4: Moderately severe disability  []5: Severe disability  []6: Death        PHQ-9 Depression Screening  Little interest or pleasure in doing things? Not at all   Feeling down, depressed, or hopeless? Not at all   PHQ-2 Total Score 0   Trouble falling or staying asleep, or sleeping too much?     Feeling tired or having little energy?     Poor appetite or overeating?     Feeling bad about yourself - or that you are a failure or have let yourself or your family down?     Trouble concentrating on things, such as reading the newspaper or watching television?     Moving or speaking so slowly that other people could have noticed? Or the opposite - being so fidgety or restless that you have been moving around a lot more than usual?     Thoughts that you would be better off dead, or of hurting yourself in some way?     PHQ-9 Total Score     If you checked off any problems, how difficult have these problems made it for you to do your work, take care of things at home, or get along with other people?         IMAGING and LAB RESULTS:  Narrative & Impression   PROCEDURE: CT ANGIOGRAM HEAD AND NECK-      HISTORY: follow up on basilar tip aneurysm; I72.5-Aneurysm of other  precerebral arteries     COMPARISON: 6/16/2024     TECHNIQUE: Thin section axial CT with IV contrast supplemented with  multiplanar reconstruction of the head and neck.      FINDINGS:     Head CT: The ventricles are normal in size. There is no evidence of  hemorrhage.  No masses are identified.  No extra-axial fluid is seen.   The sinuses are normal.     CTA:      Aortic arch:  Arch shows no significant narrowing. Great vessel origins  are widely patent.     Right carotid:  No significant stenosis is seen of the cervical common  or internal carotid artery.     Left carotid:  No significant stenosis is seen of the cervical common or  internal carotid artery.     Vertebrals: There is a codominant vertebral system. No significant  stenosis is present.     The cranial circulation again demonstrates a 4 mm basilar tip artery  aneurysm, stable from prior. The remainder of the cranial circulation is  unremarkable.     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     No significant left or right ICA stenosis.        This study was performed with techniques to keep radiation doses as low  as reasonably achievable (ALARA). Individualized dose reduction  techniques using automated exposure control or adjustment of mA and/or  kV according to the patient size were employed.               Images were reviewed, interpreted, and dictated by Dr. Jessika Sorto MD  Transcribed by Artie Dukes PA-C.     This report was signed and finalized on 10/3/2024 3:15 PM by Jessika Sorto MD.     9-:  Potassium 3.1  Creatinine 0.7   Glucose 143  Sodium 140    8-:  Total cholesterol 190  Triglycerides 145  HDL 53      Assessment / Plan      Assessment/Plan:   # Acute left frontal lobe infarct   -suspect cardioembolic source, no known history of PAF.   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 LDL on initial check 83, recheck on 8/28/2024 117  -Repatha initiated per cardiology      Discussed the importance of medication compliance and lifestyle modifications (adequate blood pressure control, adequate control of hyperlipidemia, adequate glycemic control, increase physical activity, and healthy diet) to help reduce the risk of future cerebrovascular events.  Also discussed the signs symptoms that would warrant the patient return back to the emergency department including unilateral weakness, unilateral numbness, visual disturbances, loss of balance, speech difficulties, and/or a sudden severe headache.      Follow Up:   Return in about 6 months (around 5/1/2025).    CONTRERAS Roberto  [facility]

## 2024-10-31 NOTE — TELEPHONE ENCOUNTER
Returned 's call and relayed the message from our provider.  She verbalized understanding and was thankful for the call.

## 2024-11-01 ENCOUNTER — OFFICE VISIT (OUTPATIENT)
Dept: NEUROLOGY | Facility: CLINIC | Age: 74
End: 2024-11-01
Payer: MEDICARE

## 2024-11-01 VITALS
WEIGHT: 146 LBS | SYSTOLIC BLOOD PRESSURE: 124 MMHG | DIASTOLIC BLOOD PRESSURE: 66 MMHG | HEIGHT: 63 IN | OXYGEN SATURATION: 97 % | HEART RATE: 65 BPM | BODY MASS INDEX: 25.87 KG/M2 | TEMPERATURE: 97.7 F

## 2024-11-01 DIAGNOSIS — J30.2 SEASONAL ALLERGIES: ICD-10-CM

## 2024-11-01 DIAGNOSIS — I10 ESSENTIAL HYPERTENSION: ICD-10-CM

## 2024-11-01 DIAGNOSIS — I69.391 DYSPHAGIA DUE TO RECENT STROKE: ICD-10-CM

## 2024-11-01 DIAGNOSIS — E78.2 MIXED HYPERLIPIDEMIA: ICD-10-CM

## 2024-11-01 DIAGNOSIS — Z86.73 HISTORY OF STROKE: Primary | ICD-10-CM

## 2024-11-01 DIAGNOSIS — I72.5 BASILAR ARTERY ANEURYSM: ICD-10-CM

## 2024-11-01 RX ORDER — LOSARTAN POTASSIUM 100 MG/1
1 TABLET ORAL DAILY
COMMUNITY
Start: 2024-10-14

## 2024-11-01 RX ORDER — FLUTICASONE PROPIONATE 50 MCG
2 SPRAY, SUSPENSION (ML) NASAL DAILY
Refills: 1
Start: 2024-11-01 | End: 2024-12-01

## 2024-11-01 RX ORDER — CARVEDILOL 6.25 MG/1
6.25 TABLET ORAL 2 TIMES DAILY WITH MEALS
COMMUNITY
Start: 2024-10-14

## 2025-01-21 ENCOUNTER — TELEPHONE (OUTPATIENT)
Dept: CARDIOLOGY | Facility: CLINIC | Age: 75
End: 2025-01-21
Payer: MEDICARE

## 2025-01-21 NOTE — TELEPHONE ENCOUNTER
Patient states she was seen by PCP on 1/17. She was told that she has heart failure and advised to contact our office. Patient has been experiencing leg swelling for some time. Patient takes Lasix 20 mg daily. Patient reports she had a chest xray and blood work done. PCP advised to take an extra dose of Lasix for 3 days. Patient reports she was told by PCP to let us know that one of her lab results was 349, but she is unsure of what lab it was. Contacted PCP office, they will fax over labs, last office visit, and xray results. Advised patient we will contact her once Dr. Ortega has reviewed records.

## 2025-01-24 NOTE — TELEPHONE ENCOUNTER
Spoke with the patient about recommendations. Patient verbalizes understanding. Made patient a sooner appointment to be seen in [place] on 3/4/25 at 1:15 pm.

## 2025-03-04 ENCOUNTER — OFFICE VISIT (OUTPATIENT)
Dept: CARDIOLOGY | Facility: CLINIC | Age: 75
End: 2025-03-04
Payer: MEDICARE

## 2025-03-04 VITALS
BODY MASS INDEX: 26.43 KG/M2 | HEART RATE: 56 BPM | OXYGEN SATURATION: 96 % | HEIGHT: 62 IN | DIASTOLIC BLOOD PRESSURE: 80 MMHG | SYSTOLIC BLOOD PRESSURE: 146 MMHG | WEIGHT: 143.6 LBS

## 2025-03-04 DIAGNOSIS — E78.2 MIXED HYPERLIPIDEMIA: ICD-10-CM

## 2025-03-04 DIAGNOSIS — I10 ESSENTIAL HYPERTENSION: Primary | ICD-10-CM

## 2025-03-04 DIAGNOSIS — I63.9 ACUTE CVA (CEREBROVASCULAR ACCIDENT): ICD-10-CM

## 2025-03-04 RX ORDER — DIPHENOXYLATE HYDROCHLORIDE AND ATROPINE SULFATE 2.5; .025 MG/1; MG/1
1 TABLET ORAL DAILY
COMMUNITY

## 2025-03-04 RX ORDER — SPIRONOLACTONE 25 MG/1
12.5 TABLET ORAL DAILY
Qty: 30 TABLET | Refills: 5 | Status: SHIPPED | OUTPATIENT
Start: 2025-03-04

## 2025-03-04 NOTE — PROGRESS NOTES
Established Patient Office Visit    Patient Name: Aminah Calderon  : 1950   MRN: [id number]   Care Team: Patient Care Team:  Yung Marie MD as PCP - General (Internal Medicine)  Maikol Ortega MD as Cardiologist (Cardiology)    Chief Complaint   Patient presents with    Acute CVA (cerebrovascular accident)     HPI: Aminah Calderon is a 74 y.o. female with a history of hypertension, basilar artery aneurysm, and stroke of the left posterior frontal lobe in 2024 who presents today for routine cardiology follow-up.  She underwent JAMARI and ILR placement during that admission to help elucidate cause of stroke.  Remote monitoring of her monitor has not shown any evidence of atrial fibrillation and she denies any palpitations.  She does intermittently check her blood pressure at home and has been having readings in the 150s over 70s.    Subjective   Review of Systems   Constitutional: Negative for decreased appetite and malaise/fatigue.   Cardiovascular:  Negative for chest pain, dyspnea on exertion, irregular heartbeat and palpitations.       Social History     Tobacco Use   Smoking Status Former    Current packs/day: 0.00    Types: Cigarettes    Quit date:     Years since quittin.2    Passive exposure: Past   Smokeless Tobacco Never        Allergies   Allergen Reactions    Statins Myalgia         Current Outpatient Medications:     Acetaminophen (TYLENOL ARTHRITIS PAIN PO), Take  by mouth. Pt states she takes 2 in morning, 1 at lunch, 2 at bedtime, Disp: , Rfl:     amLODIPine (NORVASC) 5 MG tablet, Take 1 tablet by mouth Daily., Disp: , Rfl:     aspirin (Russel Aspirin) 325 MG tablet, Take 1 tablet by mouth Daily., Disp: 30 tablet, Rfl: 11    carvedilol (COREG) 6.25 MG tablet, Take 1 tablet by mouth 2 (Two) Times a Day With Meals., Disp: , Rfl:     escitalopram (LEXAPRO) 10 MG tablet, Take 1 tablet by mouth Daily., Disp: , Rfl:     fluticasone (FLONASE) 50 MCG/ACT nasal spray, 2 sprays by Each Nare  "route Daily for 30 days., Disp: , Rfl: 1    furosemide (LASIX) 20 MG tablet, Take 1 tablet by mouth Daily., Disp: 90 tablet, Rfl: 0    losartan (COZAAR) 100 MG tablet, Take 1 tablet by mouth Daily., Disp: , Rfl:     magnesium oxide (MAG-OX) 400 MG tablet, Take 1 tablet by mouth Daily., Disp: , Rfl:     pantoprazole (PROTONIX) 40 MG EC tablet, Take 1 tablet by mouth Daily., Disp: , Rfl:     potassium chloride (KLOR-CON M20) 20 MEQ CR tablet, Take 1 tablet by mouth Daily., Disp: 90 tablet, Rfl: 3    Repatha SureClick solution auto-injector SureClick injection, INJECT 1ml UNDER THE SKIN every 2 WEEKS, Disp: , Rfl:     vitamin D3 125 MCG (5000 UT) capsule capsule, Take 1 capsule by mouth Daily., Disp: , Rfl:     zolpidem (AMBIEN) 5 MG tablet, Have not started yet, Disp: , Rfl:       Objective     Vitals:    03/04/25 1305   BP: 146/80   BP Location: Left arm   Patient Position: Sitting   Pulse: 56   SpO2: 96%   Weight: 65.1 kg (143 lb 9.6 oz)   Height: 157.5 cm (62\")   Body mass index is 26.26 kg/m².  Gen: well developed, sitting up on exam table, comfortable appearing  HEENT: MMM, sclera anicteric, conjunctiva normal, no carotid bruits  CV: regular rate, regular rhythm, no murmurs or rubs, normal S1, S2. 2+ radial and DP pulses  Pulm: RA, normal work of breathing, no wheezes, rales, rhonchi  Ext: normal bulk for age, normal tone, no dependent edema  Neuro: Awake and alert, right facial droop and hemiparesis  Psych: normal mood, appropriate affect    RESULTS:   Procedures    Results for orders placed during the hospital encounter of 06/16/24    Adult Transesophageal Echo (JAMARI) W/ Cont if Necessary Per Protocol    Interpretation Summary    Left ventricular ejection fraction appears to be 56 - 60%.    Moderate mitral valve regurgitation is present.    No evidence of a left atrial appendage thrombus    Saline test results are negative.    Moderate mitral valve regurgitation is present.    The aortic valve exhibits " sclerosis. Mild aortic valve regurgitation is present.    There is mild, (grade 2) plaque in the aortic arch present.        Most recent PCP note, imaging tests, and labs reviewed.    Labs:  Lab Results   Component Value Date    WBC 8.58 06/20/2024    HGB 12.9 06/20/2024    HCT 39.6 06/20/2024    MCV 88.6 06/20/2024     06/20/2024     Lab Results   Component Value Date    GLUCOSE 123 (H) 06/20/2024    BUN 12 06/20/2024    CREATININE 0.57 06/20/2024    BCR 21.1 06/20/2024    K 3.2 (L) 06/20/2024    CO2 25.0 06/20/2024    CALCIUM 8.5 (L) 06/20/2024    AST 12 06/20/2024    ALT 11 06/20/2024     Lab Results   Component Value Date    HGBA1C 5.50 06/17/2024     Lab Results   Component Value Date    CHOL 185 06/17/2024    TRIG 211 (H) 06/17/2024    HDL 67 (H) 06/17/2024    LDL 83 06/17/2024       Advance Care Planning   ACP discussion was declined by the patient. Patient does not have an advance directive, information provided.       Assessment & Plan       ICD-10-CM ICD-9-CM   1. Essential hypertension  I10 401.9   2. Acute CVA (cerebrovascular accident)  I63.9 434.91   3. Mixed hyperlipidemia  E78.2 272.2        History of left MCA stroke   - No arrhythmias seen on ILR to date   - On aspirin, Repatha due to statin AE    Hypertension   - Above goal today as well as on home readings.  Will discontinue potassium and add spironolactone 12.5 mg daily and check BMP in 2 weeks.    Return in about 6 months (around 9/4/2025).    FADIA Ortega MD  03/04/25    [facility]  [address] [place] [id number] [phone]

## 2025-05-02 ENCOUNTER — OFFICE VISIT (OUTPATIENT)
Dept: NEUROLOGY | Facility: CLINIC | Age: 75
End: 2025-05-02
Payer: MEDICARE

## 2025-05-02 VITALS
WEIGHT: 137 LBS | SYSTOLIC BLOOD PRESSURE: 120 MMHG | BODY MASS INDEX: 25.21 KG/M2 | HEART RATE: 72 BPM | OXYGEN SATURATION: 98 % | TEMPERATURE: 98.4 F | HEIGHT: 62 IN | DIASTOLIC BLOOD PRESSURE: 68 MMHG

## 2025-05-02 DIAGNOSIS — I72.5 BASILAR ARTERY ANEURYSM: Primary | ICD-10-CM

## 2025-05-02 NOTE — PROGRESS NOTES
Follow Up Office Visit      Encounter Date: 2025   Patient Name: Aminah Calderon  : 1950   MRN: [id number]   PCP: Yung Marie MD    Chief Complaint:    Chief Complaint   Patient presents with    Follow-up       History of Present Illness: Aminah Tellez is a 73-year-old female former smoker PMH HTN, HLD, GERD, anxiety, depression who presented to [facility] for acute onset dysarthria and right hand weakness.  CT head 2024 without hemorrhage.  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  TNK was deferred by [facility] due to aneurysm.  CT angiogram head and neck at outside hospital mentioned possible left transverse and sigmoid sinus thromboses.  Patient was started on heparin drip and nicardipine started for SBP <180 while on heparin drip.  Patient was transferred to [facility] for higher level of care.  We repeated CT angiogram head and neck, CT venogram, and MR venogram which did not reveal sinus thromboses thus heparin drip and nicardipine were discontinued to allow for permissive hypertension. MRI brain 2024 revealed acute left frontal lobe infarct. Patient was discharged home on DAPT x 30 days followed by  mg.      Clinic visit 2024: Patient presents to clinic today to establish care following her recent stroke in .  She reports that she is doing well and feels that she is making improvements.  She does continue to have some mild dysarthria and dysphagia.  She is working with speech therapy twice a week.  She shares with me that she cannot take any cholesterol medicines because they make her very weak and have leg cramps.  She has tried multiple statins and Zetia unsuccessfully.  We discussed some lifestyle modifications and that she should be monitored closely to ensure that her LDL is at least maintaining at its current level of 83.  Should her numbers increase she needs to explore other options like Repatha.     Clinic visit  11/1/2024: She is seen in the clinic today with her  and her son. CTA head and neck repeated 10/3/2024 without any aneurysm growth. She has continued dysphagia and dysarthria. She is completed visits with SL, unsure if this is secondary to insurance allowance. She has continued significant SLP needs. Discussed and encouraged continuation of exercises independently. Encouraged her to read aloud as well. Since last visit she has been initiated on Repatha by Cardiology. She reports that she is tolerating all of her medications well. She does report that she is out of Flonase and has noticed and increase in drainage which has mildly increased her swallowing difficulties at times.    Clinic visit 5/2/2025: Patient is accompanied by her son today. She is doing well with no new or worsening stroke like symptoms. She continues to have speech issues but they have greatly improved. She also reports very mild right hand weakness. She continues to take aspirin 325 mg and Repatha without issues or side effects. Repatha has greatly improved her cholesterol levels. The CTA head and neck that was completed in October showed stable appearance of her basilar tip aneurysm. Will repeat on annual basis.     Subjective        I have reviewed and the following portions of the patient's history were updated as appropriate: past family history, past medical history, past social history, past surgical history and problem list.    Medications:     Current Outpatient Medications:     amLODIPine (NORVASC) 5 MG tablet, Take 1 tablet by mouth Daily., Disp: , Rfl:     aspirin (Russel Aspirin) 325 MG tablet, Take 1 tablet by mouth Daily., Disp: 30 tablet, Rfl: 11    carvedilol (COREG) 6.25 MG tablet, Take 1 tablet by mouth 2 (Two) Times a Day With Meals., Disp: , Rfl:     escitalopram (LEXAPRO) 10 MG tablet, Take 1 tablet by mouth Daily., Disp: , Rfl:     fluticasone (FLONASE) 50 MCG/ACT nasal spray, 2 sprays by Each Nare route Daily for 30  "days., Disp: , Rfl: 1    furosemide (LASIX) 20 MG tablet, Take 1 tablet by mouth Daily., Disp: 90 tablet, Rfl: 0    losartan (COZAAR) 100 MG tablet, Take 1 tablet by mouth Daily., Disp: , Rfl:     magnesium oxide (MAG-OX) 400 MG tablet, Take 1 tablet by mouth Daily., Disp: , Rfl:     multivitamin (MULTI VITAMIN PO), Take 1 tablet by mouth Daily., Disp: , Rfl:     pantoprazole (PROTONIX) 40 MG EC tablet, Take 1 tablet by mouth Daily., Disp: , Rfl:     Repatha SureClick solution auto-injector SureClick injection, INJECT 1ml UNDER THE SKIN every 2 WEEKS, Disp: , Rfl:     spironolactone (ALDACTONE) 25 MG tablet, Take 0.5 tablets by mouth Daily., Disp: 30 tablet, Rfl: 5    vitamin D3 125 MCG (5000 UT) capsule capsule, Take 1 capsule by mouth Daily., Disp: , Rfl:     Acetaminophen (TYLENOL ARTHRITIS PAIN PO), Take  by mouth. Pt states she takes 2 in morning, 1 at lunch, 2 at bedtime, Disp: , Rfl:     Allergies:   Allergies   Allergen Reactions    Statins Myalgia       Objective     Physical Exam:   Vital Signs:   Vitals:    05/02/25 1133   BP: 120/68   Pulse: 72   Temp: 98.4 °F (36.9 °C)   SpO2: 98%   Weight: 62.1 kg (137 lb)   Height: 157.5 cm (62.01\")     Body mass index is 25.05 kg/m².    Physical Exam  Vitals and nursing note reviewed.   Constitutional:       General: She is awake. She is not in acute distress.     Appearance: Normal appearance. She is normal weight. She is not ill-appearing.      Comments: 74 year old  female    HENT:      Head: Normocephalic and atraumatic.      Nose: Nose normal.      Mouth/Throat:      Mouth: Mucous membranes are moist.   Eyes:      General: Lids are normal.      Extraocular Movements: Extraocular movements intact.      Pupils: Pupils are equal, round, and reactive to light.   Cardiovascular:      Rate and Rhythm: Normal rate and regular rhythm.      Pulses: Normal pulses.   Pulmonary:      Effort: Pulmonary effort is normal. No respiratory distress.   Skin:     General: " Skin is warm and dry.   Neurological:      Mental Status: She is alert and oriented to person, place, and time.      Cranial Nerves: Cranial nerve deficit and dysarthria present.      Sensory: No sensory deficit.      Motor: Weakness present.      Coordination: Coordination normal.      Gait: Gait normal.   Psychiatric:         Mood and Affect: Mood normal.         Behavior: Behavior normal.       Neurological Exam  Mental Status  Awake and alert. Oriented to person, place, time and situation. Oriented to person, place, and time. Mild dysarthria present. Expressive aphasia present. Attention and concentration are normal. Fund of knowledge is appropriate for level of education.    Cranial Nerves  CN II: Right visual acuity: Counts fingers. Left visual acuity: Counts fingers. Visual fields full to confrontation.  CN III, IV, VI: Extraocular movements intact bilaterally. Normal lids and orbits bilaterally. Pupils equal round and reactive to light bilaterally.  CN V: Facial sensation is normal.  CN VII:  Right: There is central facial weakness.  CN VIII: Hearing is normal to speech .  CN XI: Shoulder shrug strength is normal.  CN XII: Tongue midline without atrophy or fasciculations.    Motor  Normal muscle bulk throughout. No fasciculations present. Normal muscle tone. Strength is 5/5 in all four extremities except as noted.  Right  4+/5.    Sensory  Light touch is normal in upper and lower extremities.     Coordination  Right: Finger-to-nose normal.Left: Finger-to-nose normal.  No obvious dysmetria .    Gait   Normal gait.Casual gait is normal including stance, stride, and arm swing.       Modified Hartleton Score: 1        0  No Symptoms    1 No significant disability. Able to carry out all usual activities, despite some symptoms.    2 Slight disability. Able to look after own affairs without assistance, but unable to carry out all previous activities.    3 Moderate disability. Requires some help, but able to walk  unassisted.    4 Moderately severe disability. Unable to attend to own bodily needs without assistance, and unable to walk unassisted.    5 Severe disability. Requires constant nursing care and attention, bedridden, incontinent.    6 Dead      PHQ-9 Depression Screening  Little interest or pleasure in doing things? Not at all   Feeling down, depressed, or hopeless? Not at all   PHQ-2 Total Score 0   Trouble falling or staying asleep, or sleeping too much?     Feeling tired or having little energy?     Poor appetite or overeating?     Feeling bad about yourself - or that you are a failure or have let yourself or your family down?     Trouble concentrating on things, such as reading the newspaper or watching television?     Moving or speaking so slowly that other people could have noticed? Or the opposite - being so fidgety or restless that you have been moving around a lot more than usual?       Thoughts that you would be better off dead, or of hurting yourself in some way?     PHQ-9 Total Score     If you checked off any problems, how difficult have these problems made it for you to do your work, take care of things at home, or get along with other people?             Hemoglobin   Date Value Ref Range Status   06/20/2024 12.9 12.0 - 15.9 g/dL Final     Hematocrit   Date Value Ref Range Status   06/20/2024 39.6 34.0 - 46.6 % Final     Platelets   Date Value Ref Range Status   06/20/2024 276 140 - 450 10*3/mm3 Final     Hemoglobin A1C   Date Value Ref Range Status   06/17/2024 5.50 4.80 - 5.60 % Final     LDL Cholesterol    Date Value Ref Range Status   06/17/2024 83 0 - 100 mg/dL Final     AST (SGOT)   Date Value Ref Range Status   06/20/2024 12 1 - 32 U/L Final     ALT (SGPT)   Date Value Ref Range Status   06/20/2024 11 1 - 33 U/L Final          Assessment / Plan      Assessment/Plan:   # History of left frontal lobe stroke (June 2024)  -Suspect cardioembolic source, no known history of PAF.   -Completed DAPT x 30  day  -Continue  mg daily for secondary stroke prevention.   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 Ordered today for 10/2025     # Hypertension   -Goal BP <130/80  -BP today in clinic 120/68     # Hyperlipidemia  -Repatha initiated per cardiology      Discussed the importance of medication compliance and lifestyle modifications (adequate blood pressure control, adequate control of hyperlipidemia, adequate glycemic control, increase physical activity, and healthy diet) to help reduce the risk of future cerebrovascular events.  Also discussed the signs symptoms that would warrant the patient return back to the emergency department including unilateral weakness, unilateral numbness, visual disturbances, loss of balance, speech difficulties, and/or a sudden severe headache.     Follow Up:   Return in about 1 year (around 5/2/2026).      CONTRERAS Shaffer  [facility]

## 2025-06-30 LAB
MDC_IDC_PG_IMPLANT_DTM: NORMAL
MDC_IDC_PG_MFG: NORMAL
MDC_IDC_PG_MODEL: NORMAL
MDC_IDC_PG_SERIAL: NORMAL
MDC_IDC_PG_TYPE: NORMAL
MDC_IDC_SESS_DTM: NORMAL
MDC_IDC_SESS_TYPE: NORMAL
